# Patient Record
Sex: FEMALE | Race: WHITE | NOT HISPANIC OR LATINO | Employment: FULL TIME | URBAN - METROPOLITAN AREA
[De-identification: names, ages, dates, MRNs, and addresses within clinical notes are randomized per-mention and may not be internally consistent; named-entity substitution may affect disease eponyms.]

---

## 2017-09-16 ENCOUNTER — APPOINTMENT (OUTPATIENT)
Dept: CARDIOLOGY | Facility: CLINIC | Age: 27
End: 2017-09-16
Attending: EMERGENCY MEDICINE
Payer: COMMERCIAL

## 2017-09-16 ENCOUNTER — APPOINTMENT (OUTPATIENT)
Dept: GENERAL RADIOLOGY | Facility: CLINIC | Age: 27
End: 2017-09-16
Attending: EMERGENCY MEDICINE
Payer: COMMERCIAL

## 2017-09-16 ENCOUNTER — HOSPITAL ENCOUNTER (EMERGENCY)
Facility: CLINIC | Age: 27
Discharge: HOME | End: 2017-09-16
Attending: EMERGENCY MEDICINE | Admitting: EMERGENCY MEDICINE
Payer: COMMERCIAL

## 2017-09-16 VITALS
TEMPERATURE: 97.8 F | RESPIRATION RATE: 18 BRPM | DIASTOLIC BLOOD PRESSURE: 79 MMHG | SYSTOLIC BLOOD PRESSURE: 134 MMHG | BODY MASS INDEX: 20.83 KG/M2 | OXYGEN SATURATION: 99 % | HEART RATE: 90 BPM | WEIGHT: 125 LBS | HEIGHT: 65 IN

## 2017-09-16 DIAGNOSIS — R07.9 CHEST PAIN, UNSPECIFIED TYPE: ICD-10-CM

## 2017-09-16 LAB
ANION GAP SERPL CALCULATED.3IONS-SCNC: 11 MMOL/L (ref 3–14)
BASOPHILS # BLD AUTO: 0 10E9/L (ref 0–0.2)
BASOPHILS NFR BLD AUTO: 0.2 %
BUN SERPL-MCNC: 35 MG/DL (ref 7–30)
CALCIUM SERPL-MCNC: 9.4 MG/DL (ref 8.5–10.1)
CHLORIDE SERPL-SCNC: 105 MMOL/L (ref 94–109)
CO2 SERPL-SCNC: 22 MMOL/L (ref 20–32)
CREAT SERPL-MCNC: 0.76 MG/DL (ref 0.52–1.04)
D DIMER PPP FEU-MCNC: 0.4 UG/ML FEU (ref 0–0.5)
DIFFERENTIAL METHOD BLD: NORMAL
EOSINOPHIL # BLD AUTO: 0.1 10E9/L (ref 0–0.7)
EOSINOPHIL NFR BLD AUTO: 0.6 %
ERYTHROCYTE [DISTWIDTH] IN BLOOD BY AUTOMATED COUNT: 13.3 % (ref 10–15)
GFR SERPL CREATININE-BSD FRML MDRD: 89 ML/MIN/1.7M2
GLUCOSE SERPL-MCNC: 84 MG/DL (ref 70–99)
HCG SERPL QL: NEGATIVE
HCT VFR BLD AUTO: 41.9 % (ref 35–47)
HGB BLD-MCNC: 14.4 G/DL (ref 11.7–15.7)
IMM GRANULOCYTES # BLD: 0 10E9/L (ref 0–0.4)
IMM GRANULOCYTES NFR BLD: 0.1 %
INTERPRETATION ECG - MUSE: NORMAL
LYMPHOCYTES # BLD AUTO: 1.1 10E9/L (ref 0.8–5.3)
LYMPHOCYTES NFR BLD AUTO: 13.4 %
MCH RBC QN AUTO: 29.4 PG (ref 26.5–33)
MCHC RBC AUTO-ENTMCNC: 34.4 G/DL (ref 31.5–36.5)
MCV RBC AUTO: 86 FL (ref 78–100)
MONOCYTES # BLD AUTO: 0.9 10E9/L (ref 0–1.3)
MONOCYTES NFR BLD AUTO: 10.6 %
NEUTROPHILS # BLD AUTO: 6.2 10E9/L (ref 1.6–8.3)
NEUTROPHILS NFR BLD AUTO: 75.1 %
NRBC # BLD AUTO: 0 10*3/UL
NRBC BLD AUTO-RTO: 0 /100
PLATELET # BLD AUTO: 223 10E9/L (ref 150–450)
POTASSIUM SERPL-SCNC: 3.5 MMOL/L (ref 3.4–5.3)
RBC # BLD AUTO: 4.89 10E12/L (ref 3.8–5.2)
SODIUM SERPL-SCNC: 138 MMOL/L (ref 133–144)
TROPONIN I SERPL-MCNC: <0.015 UG/L (ref 0–0.04)
WBC # BLD AUTO: 8.2 10E9/L (ref 4–11)

## 2017-09-16 PROCEDURE — 80048 BASIC METABOLIC PNL TOTAL CA: CPT | Performed by: EMERGENCY MEDICINE

## 2017-09-16 PROCEDURE — 84703 CHORIONIC GONADOTROPIN ASSAY: CPT | Performed by: EMERGENCY MEDICINE

## 2017-09-16 PROCEDURE — 71020 XR CHEST 2 VW: CPT

## 2017-09-16 PROCEDURE — 25500064 ZZH RX 255 OP 636: Performed by: EMERGENCY MEDICINE

## 2017-09-16 PROCEDURE — 99285 EMERGENCY DEPT VISIT HI MDM: CPT | Mod: 25

## 2017-09-16 PROCEDURE — 85379 FIBRIN DEGRADATION QUANT: CPT | Performed by: EMERGENCY MEDICINE

## 2017-09-16 PROCEDURE — 93306 TTE W/DOPPLER COMPLETE: CPT | Mod: 26 | Performed by: INTERNAL MEDICINE

## 2017-09-16 PROCEDURE — 25000132 ZZH RX MED GY IP 250 OP 250 PS 637: Performed by: EMERGENCY MEDICINE

## 2017-09-16 PROCEDURE — 25000128 H RX IP 250 OP 636: Performed by: EMERGENCY MEDICINE

## 2017-09-16 PROCEDURE — 96374 THER/PROPH/DIAG INJ IV PUSH: CPT | Mod: 59

## 2017-09-16 PROCEDURE — 96375 TX/PRO/DX INJ NEW DRUG ADDON: CPT

## 2017-09-16 PROCEDURE — 93005 ELECTROCARDIOGRAM TRACING: CPT

## 2017-09-16 PROCEDURE — 84484 ASSAY OF TROPONIN QUANT: CPT | Performed by: EMERGENCY MEDICINE

## 2017-09-16 PROCEDURE — 85025 COMPLETE CBC W/AUTO DIFF WBC: CPT | Performed by: EMERGENCY MEDICINE

## 2017-09-16 PROCEDURE — 40000264 ECHO COMPLETE WITH LUMASON

## 2017-09-16 RX ORDER — KETOROLAC TROMETHAMINE 30 MG/ML
30 INJECTION, SOLUTION INTRAMUSCULAR; INTRAVENOUS EVERY 6 HOURS PRN
Status: DISCONTINUED | OUTPATIENT
Start: 2017-09-16 | End: 2017-09-16 | Stop reason: HOSPADM

## 2017-09-16 RX ORDER — ASPIRIN 81 MG/1
324 TABLET, CHEWABLE ORAL ONCE
Status: COMPLETED | OUTPATIENT
Start: 2017-09-16 | End: 2017-09-16

## 2017-09-16 RX ADMIN — KETOROLAC TROMETHAMINE 30 MG: 30 INJECTION, SOLUTION INTRAMUSCULAR at 14:03

## 2017-09-16 RX ADMIN — ASPIRIN 81 MG 324 MG: 81 TABLET ORAL at 12:43

## 2017-09-16 RX ADMIN — SULFUR HEXAFLUORIDE 5 ML: KIT at 16:09

## 2017-09-16 NOTE — ED AVS SNAPSHOT
Emergency Department    [address] [facility] [address]    Phone:  [phone]    Fax:  [phone]                                       Diana Altamirano   MRN: [id number]    Department:   Emergency Department   Date of Visit:  9/16/2017           Patient Information     Date Of Birth          1990        Your diagnoses for this visit were:     Chest pain, unspecified type        You were seen by Marce Parnell MD.      Follow-up Information     Follow up with Nikhil Amador MD In 3 days.    Specialty:  Internal Medicine    Contact information:    [facility]  [address] [facility] [address]  [facility] [address]  [phone]          Discharge Instructions          *CHEST PAIN, UNCERTAIN CAUSE    Based on your exam today, the exact cause of your chest pain is not certain. Your condition does not seem serious at this time, and your pain does not appear to be coming from your heart. However, sometimes the signs of a serious problem take more time to appear. Therefore, watch for the warning signs listed below.  HOME CARE:  1. Rest today and avoid strenuous activity.  2. Take any prescribed medicine as directed.  FOLLOW UP with your doctor in 1-3 days.   GET PROMPT MEDICAL ATTENTION if any of the following occur:    A change in the type of pain: if it feels different, becomes more severe, lasts longer, or begins to spread into your shoulder, arm, neck, jaw or back    Shortness of breath or increased pain with breathing    Weakness, dizziness, or fainting    Cough with blood or dark colored sputum (phlegm)    Fever over 101  F (38.3  C)    Swelling, pain or redness in one leg    [address] [place] [address]. All rights reserved. This information is not intended as a substitute for professional medical care. Always follow your healthcare professional's instructions.      24 Hour Appointment Hotline       To make an appointment at any [facility]  clinic, call [phone] ([phone]). If you don't have a family doctor or clinic, we will help you find one. [facility] are conveniently located to serve the needs of you and your family.             Review of your medicines      Notice     You have not been prescribed any medications.            Procedures and tests performed during your visit     Basic metabolic panel    CBC with platelets differential    Chest XR,  PA & LAT    D dimer quantitative    EKG 12 lead    EKG 12-lead, tracing only    Echo Complete with Lumason    HCG QUALitative pregnancy (blood)    Peripheral IV: Standard    Troponin I      Orders Needing Specimen Collection     None      Pending Results     No orders found from 9/14/2017 to 9/17/2017.            Pending Culture Results     No orders found from 9/14/2017 to 9/17/2017.            Pending Results Instructions     If you had any lab results that were not finalized at the time of your Discharge, you can call the ED Lab Result RN at [phone]. You will be contacted by this team for any positive Lab results or changes in treatment. The nurses are available 7 days a week from 10A to 6:30P.  You can leave a message 24 hours per day and they will return your call.        Test Results From Your Hospital Stay        9/16/2017 12:50 PM      Component Results     Component Value Ref Range & Units Status    WBC 8.2 4.0 - 11.0 10e9/L Final    RBC Count 4.89 3.8 - 5.2 10e12/L Final    Hemoglobin 14.4 11.7 - 15.7 g/dL Final    Hematocrit 41.9 35.0 - 47.0 % Final    MCV 86 78 - 100 fl Final    MCH 29.4 26.5 - 33.0 pg Final    MCHC 34.4 31.5 - 36.5 g/dL Final    RDW 13.3 10.0 - 15.0 % Final    Platelet Count 223 150 - 450 10e9/L Final    Diff Method Automated Method  Final    % Neutrophils 75.1 % Final    % Lymphocytes 13.4 % Final    % Monocytes 10.6 % Final    % Eosinophils 0.6 % Final    % Basophils 0.2 % Final    % Immature Granulocytes 0.1 % Final    Nucleated RBCs 0 0 /100 Final     Absolute Neutrophil 6.2 1.6 - 8.3 10e9/L Final    Absolute Lymphocytes 1.1 0.8 - 5.3 10e9/L Final    Absolute Monocytes 0.9 0.0 - 1.3 10e9/L Final    Absolute Eosinophils 0.1 0.0 - 0.7 10e9/L Final    Absolute Basophils 0.0 0.0 - 0.2 10e9/L Final    Abs Immature Granulocytes 0.0 0 - 0.4 10e9/L Final    Absolute Nucleated RBC 0.0  Final         9/16/2017  1:03 PM      Component Results     Component Value Ref Range & Units Status    Sodium 138 133 - 144 mmol/L Final    Potassium 3.5 3.4 - 5.3 mmol/L Final    Chloride 105 94 - 109 mmol/L Final    Carbon Dioxide 22 20 - 32 mmol/L Final    Anion Gap 11 3 - 14 mmol/L Final    Glucose 84 70 - 99 mg/dL Final    Urea Nitrogen 35 (H) 7 - 30 mg/dL Final    Creatinine 0.76 0.52 - 1.04 mg/dL Final    GFR Estimate 89 >60 mL/min/1.7m2 Final    Non  GFR Calc    GFR Estimate If Black >90 >60 mL/min/1.7m2 Final    African American GFR Calc    Calcium 9.4 8.5 - 10.1 mg/dL Final         9/16/2017  1:08 PM      Component Results     Component Value Ref Range & Units Status    Troponin I ES <0.015 0.000 - 0.045 ug/L Final    The 99th percentile for upper reference range is 0.045 ug/L.  Troponin values   in the range of 0.045 - 0.120 ug/L may be associated with risks of adverse   clinical events.           9/16/2017  1:08 PM      Component Results     Component Value Ref Range & Units Status    D Dimer 0.4 0.0 - 0.50 ug/ml FEU Final    This D-dimer assay is intended for use in conjunction with a clinical pretest   probability assessment model to exclude pulmonary embolism (PE) and deep   venous thrombosis (DVT) in outpatients suspected of PE or DVT. The cut-off   value is 0.5 ug/mL FEU.           9/16/2017  1:04 PM      Component Results     Component Value Ref Range & Units Status    HCG Qualitative Serum Negative NEG^Negative Final    This test is for screening purposes.  Results should be interpreted along with   the clinical picture.  Confirmation testing is  available if warranted by   ordering [id number], HCG Quantitative Pregnancy.           9/16/2017  2:20 PM      Narrative     XR CHEST 2 VW   9/16/2017 1:47 PM     HISTORY: Chest pain    COMPARISON: None.    FINDINGS: The heart is negative.  The lungs are clear. The pulmonary  vasculature is normal.  The bones and soft tissues are unremarkable.        Impression     IMPRESSION: No active infiltrate.        HOLLY MATT MD                Clinical Quality Measure: Blood Pressure Screening     Your blood pressure was checked while you were in the emergency department today. The last reading we obtained was  BP: 125/80 . Please read the guidelines below about what these numbers mean and what you should do about them.  If your systolic blood pressure (the top number) is less than 120 and your diastolic blood pressure (the bottom number) is less than 80, then your blood pressure is normal. There is nothing more that you need to do about it.  If your systolic blood pressure (the top number) is 120-139 or your diastolic blood pressure (the bottom number) is 80-89, your blood pressure may be higher than it should be. You should have your blood pressure rechecked within a year by a primary care provider.  If your systolic blood pressure (the top number) is 140 or greater or your diastolic blood pressure (the bottom number) is 90 or greater, you may have high blood pressure. High blood pressure is treatable, but if left untreated over time it can put you at risk for heart attack, stroke, or kidney failure. You should have your blood pressure rechecked by a primary care provider within the next 4 weeks.  If your provider in the emergency department today gave you specific instructions to follow-up with your doctor or provider even sooner than that, you should follow that instruction and not wait for up to 4 weeks for your follow-up visit.        Thank you for choosing [facility]       Thank you for choosing [facility] for your care. Our  "goal is always to provide you with excellent care. Hearing back from our patients is one way we can continue to improve our services. Please take a few minutes to complete the written survey that you may receive in the mail after you visit with us. Thank you!        [employer] Information     [employer] lets you send messages to your doctor, view your test results, renew your prescriptions, schedule appointments and more. To sign up, go to [email or web address] . Click on \"Log in\" on the left side of the screen, which will take you to the Welcome page. Then click on \"Sign up Now\" on the right side of the page.     You will be asked to enter the access code listed below, as well as some personal information. Please follow the directions to create your username and password.     Your access code is: [email or web address]  Expires: 12/15/2017  4:55 PM     Your access code will  in 90 days. If you need help or a new code, please call your [facility] clinic or [phone].        Care EveryWhere ID     This is your Care EveryWhere ID. This could be used by other organizations to access your [facility] medical records  [id number]        Equal Access to Services     PENNY YOUNG : Sudhir Thrasher, wajessica real, chery dubose, ameya ulloa. So [facility] [phone].    ATENCIÓN: Si habla español, tiene a curry disposición servicios gratuitos de asistencia lingüística. Radha al [phone].    We comply with applicable federal civil rights laws and [place] laws. We do not discriminate on the basis of race, color, national origin, age, disability sex, sexual orientation or gender identity.            After Visit Summary       This is your record. Keep this with you and show to your community pharmacist(s) and doctor(s) at your next visit.                  "

## 2017-09-16 NOTE — ED PROVIDER NOTES
"  History     Chief Complaint:  Chest Pain    HPI   Diana Altamirano is a 27 year old female who presents to the emergency department today for evaluation of chest pain. The patient states that she has chest pain for the past 2 days that has been getting more intense. She states that she went to urgent care yesterday and it got a little better, enough for her to go to sleep. But when she woke up today it had returned. She states that it is knife like and is located just to the left of her sternum without and breathing deep makes it worse. Rocking back and forth is the only thing that makes it better according to her.  She does run frequently and denies ever feeling this pain while running. She just got back from [place] recently and when she got back her  had packed up his things and left and is not talking to her anymore. Which is giving her significant anxiety. She denies any chronic health problems, she has not had any fever, cough, shortness of breath, and does not smoke either and states that this couldn't be a lifting injury either. She has no familial history of cardiac disease.    Allergies:  No known Drug Allergies      Medications:    Medications reviewed. No current medications.     Past Medical History:    History reviewed. No pertinent past medical history.    Past Surgical History:    History reviewed. No pertinent surgical history.    Family History:    History reviewed. No pertinent family history.     Social History:  Marital Status:    Social History   Substance Use Topics     Smoking status: Never Smoker     Smokeless tobacco: Not on file     Alcohol use Yes      Comment: occ        Review of Systems   Cardiovascular: Positive for chest pain.   All other systems reviewed and are negative.    Physical Exam   First Vitals:  BP: 148/85  Pulse: 90  Heart Rate: 90  Temp: 97.8  F (36.6  C)  Resp: 18  Height: 165.1 cm (5' 5\")  Weight: 56.7 kg (125 lb)  SpO2: 100 %    Physical Exam    Physical Exam "   Constitutional:  Patient is oriented to person, place, and time. They appear well-developed and well-nourished. Mild distress secondary to chest pain   HENT:   Mouth/Throat:   Oropharynx is clear and moist.   Eyes:    Conjunctivae normal and EOM are normal. Pupils are equal, round, and reactive to light.   Neck:    Normal range of motion.   Cardiovascular: Normal rate, regular rhythm and normal heart sounds.  Exam reveals no gallop and no friction rub.  No murmur heard.  Pulmonary/Chest:  Effort normal and breath sounds normal. Patient has no wheezes. Patient has no rales. Pleuritic pain very reproducible pain to palpation over the left sternal border  Abdominal:   Soft. Bowel sounds are normal. Patient exhibits no mass. There is no tenderness. There is no rebound and no guarding.   Musculoskeletal:  Normal range of motion. Patient exhibits no edema.   Neurological:   Patient is alert and oriented to person, place, and time. Patient has normal strength. No cranial nerve deficit or sensory deficit. GCS 15  Skin:   Skin is warm and dry. No rash noted. No erythema.   Psychiatric:   Patient has a normal mood and affect. Patient's behavior is normal. Judgment and thought content normal.       Emergency Department Course     ECG:  ECG taken at 1216, ECG read at 1221  Sinus rhythm with marked sinus arrhythmia  Possible left atrial enlargement  Rightward axis  T wave abnormality, consider inferior ischemia  T wave abnormality, consider anterolateral ischemia  Abnormal ECG  Rate 80 bpm. OH interval 130 ms. QRS duration 74 ms. QT/QTc 360/415 ms. P-R-T axes 75 96 -56.      Imaging:  Radiology findings were communicated with the patient who voiced understanding of the findings.  Chest XR,  PA & LAT  Final Result  IMPRESSION: No active infiltrate.        HOLLY MATT MD  Reading per radiology     Echocardiogram:  Verbal report is normal.      Laboratory:  Laboratory findings were communicated with the patient who voiced  understanding of the findings.    Troponin (Collected 1240): <0.015   D dimer: 0.4  HCG: negative  CBC: WBC 8.2, HGB 14.4,   BMP: BUN 35 (H), o/w WNL (Creatinine 0.76)      Interventions:  1403 Toradol 30 mg IV  1243 aspirin 324 mg PO  1609 sulfur hexafluoride microsphere 5 mls IV    Emergency Department Course:  Nursing notes and vitals reviewed.  I performed an exam of the patient as documented above.   1450 Dr. Tilley from cardio 1456 1170 verbal from cardiology: echo looks normal  Orders Placed This Encounter   Procedures     Chest XR,  PA & LAT     CBC with platelets differential     Basic metabolic panel     Troponin I     D dimer quantitative     HCG Qualitative pregnancy (blood)     EKG 12 lead     EKG 12-lead, tracing only     Pulse oximetry nursing     Cardiac Continuous Monitoring     Peripheral IV: Standard     Review medications with patient     Oxygen: Nasal cannula, Oxygen mask     Echo Complete with Lumason     I discussed the treatment plan with the patient. They expressed understanding of this plan and consented to discharge. They will be discharged home with instructions for care and follow up. In addition, the patient will return to the emergency department if their symptoms persist, worsen, if new symptoms arise or if there is any concern.  All questions were answered.   I personally reviewed the lab and imaging results with the patient and answered all related questions prior to discharge.  Impression & Plan      Medical Decision Making:  Diana Altamirano is a 27 year old female who presents to the emergency department today for evaluation of pleuritic chest pain of 2 days duration. Of note she did just comeback recently form [place] and when she came back she found her  had taken all of his things and moved out. She is very stressed form this. She denies any recent infectious symptoms such as cough or fever. She has no history of coronary artery disease. She is a young healthy  athlete. She does not get pain when she runs. This pain is very reproducible and pleuritic in nature.  Her vital sings show no signs of tachycardia or high blood pressure, she is not hypoxic.    The patients EKG is abnormal with inverted t waves in the inferior as well as the anterior septal leads and there is no old to compare it too. Diagnostic test were run and she is not acutely anemic, she is not pregnant, no acute metabolic derangement, her cardiac enzyme is within normal limits and again the pain has been going on for 2 days. Her dimer is normal and her Well s criteria makes her low risk for PE. Chest X ray shows no acute findings. I spoke with her regarding CT scan but at this time I don t think it s necessary being that her blood work is normal. I spoke with Dr. Tilley of cardiology due to her abnormal EKG and at this point he recommended getting a stat echo which I did. I spoke with cardiology who read this and it s completely normal. No evidence of cardiomyopathy, taksobu, wall motion abnormalities.    At this time this does not appear to be cardiac. It s non-exertional but is very pleuritic and she did get better with Toradol. I will right her for an out patient stress test and it was deemed she would be safe for discharged if the echo was fine. She also had  [facility] give her referrals for therapist appointment    Diagnosis:    ICD-10-CM    1. Chest pain, unspecified type R07.9      Disposition:   Discharged     Scribe Disclosure:  I, Brian Rainey, am serving as a scribe at 2:55 PM on 9/16/2017 to document services personally performed by Marce Parnell MD, based on my observations and the provider's statements to me.       EMERGENCY DEPARTMENT       Marce Parnell MD  09/17/17 2093

## 2017-09-16 NOTE — DISCHARGE INSTRUCTIONS
*CHEST PAIN, UNCERTAIN CAUSE    Based on your exam today, the exact cause of your chest pain is not certain. Your condition does not seem serious at this time, and your pain does not appear to be coming from your heart. However, sometimes the signs of a serious problem take more time to appear. Therefore, watch for the warning signs listed below.  HOME CARE:  1. Rest today and avoid strenuous activity.  2. Take any prescribed medicine as directed.  FOLLOW UP with your doctor in 1-3 days.   GET PROMPT MEDICAL ATTENTION if any of the following occur:    A change in the type of pain: if it feels different, becomes more severe, lasts longer, or begins to spread into your shoulder, arm, neck, jaw or back    Shortness of breath or increased pain with breathing    Weakness, dizziness, or fainting    Cough with blood or dark colored sputum (phlegm)    Fever over 101  F (38.3  C)    Swelling, pain or redness in one leg    9321-1562 [address], [place] [address]. All rights reserved. This information is not intended as a substitute for professional medical care. Always follow your healthcare professional's instructions.

## 2017-09-16 NOTE — ED AVS SNAPSHOT
Emergency Department    [address] [place] [address]    Phone:  [phone]    Fax:  [phone]                                       Diana Altamirano   MRN: [id number]    Department:   Emergency Department   Date of Visit:  9/16/2017           After Visit Summary Signature Page     I have received my discharge instructions, and my questions have been answered. I have discussed any challenges I see with this plan with the nurse or doctor.    ..........................................................................................................................................  Patient/Patient Representative Signature      ..........................................................................................................................................  Patient Representative Print Name and Relationship to Patient    ..................................................               ................................................  Date                                            Time    ..........................................................................................................................................  Reviewed by Signature/Title    ...................................................              ..............................................  Date                                                            Time

## 2017-09-20 NOTE — PROGRESS NOTES
"  SUBJECTIVE:   Diana Altamirano is a 27 year old female who presents to clinic today for the following health issues:      ED/UC Followup:    Facility:  [facility]  Date of visit: 9/16/17  Reason for visit: Chest pain  Current Status: better       Patient was recently in the emergency room due to complaint of left-sided chest pain. Chest x-ray was normal and blood work -- which included a d-dimer and a single troponin test -- were unremarkable.  Her EKG though showed inverted T waves at leads II, III, aVL, aVF, V1-2, and V4-6.  No ST segment elevation/depression.    Since the patient's discharge from the hospital, she continues to have dull left-sided chest pain. She states that the pain feels less intense. No shortness of breath or palpitations.  Pain is not clearly precipitated/aggravated by movement.      Past medical history: denies history of cardiac problems      Review of Systems   Constitutional: Negative for chills, fatigue and fever.   Respiratory: Negative for cough and shortness of breath.    Cardiovascular: Positive for chest pain. Negative for palpitations and leg swelling.   Gastrointestinal: Negative for abdominal pain, nausea and vomiting.   Neurological: Negative for syncope and light-headedness.   Psychiatric/Behavioral: The patient is nervous/anxious.        /85  Pulse 83  Temp 98.2  F (36.8  C) (Oral)  Ht 5' 5\" (1.651 m)  Wt 119 lb (54 kg)  SpO2 98%  Breastfeeding? No  BMI 19.8 kg/m2      Physical Exam   Constitutional: She is oriented to person, place, and time. No distress.   Neck: No thyromegaly present.   Cardiovascular: Normal rate, regular rhythm and normal heart sounds.    Pulmonary/Chest: Effort normal and breath sounds normal. No respiratory distress. She exhibits tenderness (On palpation of the left upper parasternal border).   Abdominal: Soft. There is no tenderness.   Neurological: She is alert and oriented to person, place, and time.   Psychiatric:   Anxious appearing "   Vitals reviewed.        ICD-10-CM    1. Chest pain, unspecified type R07.9 CARDIOLOGY EVAL ADULT REFERRAL     EKG 12-lead complete w/read - Clinics   2. Abnormal EKG R94.31 CARDIOLOGY EVAL ADULT REFERRAL     EKG 12-lead complete w/read - Clinics     **please refer to HPI for status of conditions    **Discussed EKG results with the patient and her mother      Patient Instructions   Seek immediate medical attention if your chest pain worsens, or if you develop shortness of breath, lightheadedness/fainting, palpitations, or any other unusual symptoms.

## 2017-09-21 ENCOUNTER — OFFICE VISIT (OUTPATIENT)
Dept: FAMILY MEDICINE | Facility: CLINIC | Age: 27
End: 2017-09-21
Payer: COMMERCIAL

## 2017-09-21 VITALS
SYSTOLIC BLOOD PRESSURE: 129 MMHG | HEIGHT: 65 IN | HEART RATE: 83 BPM | TEMPERATURE: 98.2 F | BODY MASS INDEX: 19.83 KG/M2 | DIASTOLIC BLOOD PRESSURE: 85 MMHG | WEIGHT: 119 LBS | OXYGEN SATURATION: 98 %

## 2017-09-21 DIAGNOSIS — R07.9 CHEST PAIN, UNSPECIFIED TYPE: Primary | ICD-10-CM

## 2017-09-21 DIAGNOSIS — R94.31 ABNORMAL EKG: ICD-10-CM

## 2017-09-21 PROCEDURE — 93000 ELECTROCARDIOGRAM COMPLETE: CPT | Performed by: INTERNAL MEDICINE

## 2017-09-21 PROCEDURE — 99203 OFFICE O/P NEW LOW 30 MIN: CPT | Performed by: INTERNAL MEDICINE

## 2017-09-21 ASSESSMENT — ENCOUNTER SYMPTOMS
PALPITATIONS: 0
FATIGUE: 0
LIGHT-HEADEDNESS: 0
VOMITING: 0
COUGH: 0
ABDOMINAL PAIN: 0
FEVER: 0
CHILLS: 0
NAUSEA: 0
NERVOUS/ANXIOUS: 1
SHORTNESS OF BREATH: 0

## 2017-09-21 NOTE — PATIENT INSTRUCTIONS
Seek immediate medical attention if your chest pain worsens, or if you develop shortness of breath, lightheadedness/fainting, palpitations, or any other unusual symptoms.

## 2017-09-21 NOTE — MR AVS SNAPSHOT
After Visit Summary   9/21/2017    Diana Altamirano    MRN: [id number]           Patient Information     Date Of Birth          1990        Visit Information        Provider Department      9/21/2017 2:30 PM Arash Jamison MD [facility]        Today's Diagnoses     Chest pain, unspecified type    -  1    Abnormal EKG          Care Instructions    Seek immediate medical attention if your chest pain worsens, or if you develop shortness of breath, lightheadedness/fainting, palpitations, or any other unusual symptoms.          Follow-ups after your visit        Additional Services     CARDIOLOGY EVAL ADULT REFERRAL       Your provider has referred you to:  [facility]: [facility] [phone]   [email or web address]    Please be aware that coverage of these services is subject to the terms and limitations of your health insurance plan.  Call member services at your health plan with any benefit or coverage questions.      Type of Referral:  New Cardiology Consult    Timeframe requested:  1-3 Days    Please bring the following to your appointment:  >>   Any x-rays, CTs or MRIs which have been performed.  Contact the facility where they were done to arrange for  prior to your scheduled appointment.    >>   List of current medications  >>   This referral request   >>   Any documents/labs given to you for this referral                  Who to contact     If you have questions or need follow up information about today's clinic visit or your schedule please contact [facility] directly at [phone].  Normal or non-critical lab and imaging results will be communicated to you by MyChart, letter or phone within 4 business days after the clinic has received the results. If you do not hear from us within 7 days, please contact the clinic through MyChart or phone. If you have a critical or  "abnormal lab result, we will notify you by phone as soon as possible.  Submit refill requests through [email or web address] or call your pharmacy and they will forward the refill request to us. Please allow 3 business days for your refill to be completed.          Additional Information About Your Visit        MyChart Information     [email or web address] lets you send messages to your doctor, view your test results, renew your prescriptions, schedule appointments and more. To sign up, go to [email or web address] . Click on \"Log in\" on the left side of the screen, which will take you to the Welcome page. Then click on \"Sign up Now\" on the right side of the page.     You will be asked to enter the access code listed below, as well as some personal information. Please follow the directions to create your username and password.     Your access code is: [email or web address]  Expires: 12/15/2017  4:55 PM     Your access code will  in 90 days. If you need help or a new code, please call your [place] clinic or [phone].        Care EveryWhere ID     This is your Care EveryWhere ID. This could be used by other organizations to access your [place] medical records  [id number]        Your Vitals Were     Pulse Temperature Height Pulse Oximetry Breastfeeding? BMI (Body Mass Index)    83 98.2  F (36.8  C) (Oral) 5' 5\" (1.651 m) 98% No 19.8 kg/m2       Blood Pressure from Last 3 Encounters:   17 129/85   17 134/79    Weight from Last 3 Encounters:   17 119 lb (54 kg)   17 125 lb (56.7 kg)              We Performed the Following     CARDIOLOGY EVAL ADULT REFERRAL        Primary Care Provider    None       No address on file        Equal Access to Services     [facility]: Hadii holley Thrasher, waarelisda luqadaha, qaybta kaalmada gracy, ameya ulloa. So [facility] [phone].    ATENCIÓN: Si habla español, tiene a curry disposición servicios gratuitos de asistencia lingüística. Llame al " [phone].    We comply with applicable federal civil rights laws and [place] laws. We do not discriminate on the basis of race, color, national origin, age, disability sex, sexual orientation or gender identity.            Thank you!     Thank you for choosing [facility]  for your care. Our goal is always to provide you with excellent care. Hearing back from our patients is one way we can continue to improve our services. Please take a few minutes to complete the written survey that you may receive in the mail after your visit with us. Thank you!             Your Updated Medication List - Protect others around you: Learn how to safely use, store and throw away your medicines at www.disposemymeds.org.      Notice  As of 9/21/2017  2:40 PM    You have not been prescribed any medications.

## 2017-09-21 NOTE — NURSING NOTE
"Chief Complaint   Patient presents with     Hospital F/U       Initial /85  Pulse 83  Temp 98.2  F (36.8  C) (Oral)  Ht 5' 5\" (1.651 m)  Wt 119 lb (54 kg)  SpO2 98%  Breastfeeding? No  BMI 19.8 kg/m2 Estimated body mass index is 19.8 kg/(m^2) as calculated from the following:    Height as of this encounter: 5' 5\" (1.651 m).    Weight as of this encounter: 119 lb (54 kg).  Medication Reconciliation: complete   Jennifer DILLARD CMA      "

## 2018-05-01 ENCOUNTER — TRANSFERRED RECORDS (OUTPATIENT)
Dept: HEALTH INFORMATION MANAGEMENT | Facility: CLINIC | Age: 28
End: 2018-05-01

## 2018-05-01 LAB — PAP SMEAR - HIM PATIENT REPORTED: NORMAL

## 2019-01-18 ENCOUNTER — HOSPITAL ENCOUNTER (EMERGENCY)
Facility: CLINIC | Age: 29
Discharge: HOME | End: 2019-01-19
Attending: EMERGENCY MEDICINE | Admitting: EMERGENCY MEDICINE
Payer: COMMERCIAL

## 2019-01-18 DIAGNOSIS — E87.6 HYPOKALEMIA: ICD-10-CM

## 2019-01-18 DIAGNOSIS — R11.10 VOMITING AND DIARRHEA: ICD-10-CM

## 2019-01-18 DIAGNOSIS — R19.7 VOMITING AND DIARRHEA: ICD-10-CM

## 2019-01-18 DIAGNOSIS — E86.0 DEHYDRATION: ICD-10-CM

## 2019-01-18 PROCEDURE — 99284 EMERGENCY DEPT VISIT MOD MDM: CPT | Mod: 25

## 2019-01-18 PROCEDURE — 25000128 H RX IP 250 OP 636: Performed by: EMERGENCY MEDICINE

## 2019-01-18 PROCEDURE — 96374 THER/PROPH/DIAG INJ IV PUSH: CPT

## 2019-01-18 PROCEDURE — 84703 CHORIONIC GONADOTROPIN ASSAY: CPT | Performed by: EMERGENCY MEDICINE

## 2019-01-18 PROCEDURE — 85025 COMPLETE CBC W/AUTO DIFF WBC: CPT | Performed by: EMERGENCY MEDICINE

## 2019-01-18 PROCEDURE — 83690 ASSAY OF LIPASE: CPT | Performed by: EMERGENCY MEDICINE

## 2019-01-18 PROCEDURE — 80053 COMPREHEN METABOLIC PANEL: CPT | Performed by: EMERGENCY MEDICINE

## 2019-01-18 RX ORDER — SODIUM CHLORIDE 9 MG/ML
1000 INJECTION, SOLUTION INTRAVENOUS CONTINUOUS
Status: DISCONTINUED | OUTPATIENT
Start: 2019-01-18 | End: 2019-01-19 | Stop reason: HOSPADM

## 2019-01-18 RX ORDER — ONDANSETRON 2 MG/ML
4 INJECTION INTRAMUSCULAR; INTRAVENOUS EVERY 30 MIN PRN
Status: DISCONTINUED | OUTPATIENT
Start: 2019-01-18 | End: 2019-01-19 | Stop reason: HOSPADM

## 2019-01-18 RX ORDER — SODIUM CHLORIDE 9 MG/ML
INJECTION, SOLUTION INTRAVENOUS CONTINUOUS
Status: DISCONTINUED | OUTPATIENT
Start: 2019-01-18 | End: 2019-01-19 | Stop reason: HOSPADM

## 2019-01-18 RX ADMIN — ONDANSETRON 4 MG: 2 INJECTION INTRAMUSCULAR; INTRAVENOUS at 23:59

## 2019-01-18 ASSESSMENT — MIFFLIN-ST. JEOR: SCORE: 1281.07

## 2019-01-18 NOTE — ED AVS SNAPSHOT
Emergency Department  [address] [place] [id number]  Phone:  [phone]  Fax:  [phone]                                    Diana Altamirano   MRN: [id number]    Department:   Emergency Department   Date of Visit:  1/18/2019           After Visit Summary Signature Page    I have received my discharge instructions, and my questions have been answered. I have discussed any challenges I see with this plan with the nurse or doctor.    ..........................................................................................................................................  Patient/Patient Representative Signature      ..........................................................................................................................................  Patient Representative Print Name and Relationship to Patient    ..................................................               ................................................  Date                                   Time    ..........................................................................................................................................  Reviewed by Signature/Title    ...................................................              ..............................................  Date                                               Time          22EPIC Rev 08/18

## 2019-01-19 VITALS
TEMPERATURE: 98.1 F | BODY MASS INDEX: 19.29 KG/M2 | WEIGHT: 120 LBS | DIASTOLIC BLOOD PRESSURE: 60 MMHG | OXYGEN SATURATION: 99 % | RESPIRATION RATE: 16 BRPM | HEIGHT: 66 IN | HEART RATE: 89 BPM | SYSTOLIC BLOOD PRESSURE: 109 MMHG

## 2019-01-19 LAB
ALBUMIN SERPL-MCNC: 4.4 G/DL (ref 3.4–5)
ALP SERPL-CCNC: 45 U/L (ref 40–150)
ALT SERPL W P-5'-P-CCNC: 22 U/L (ref 0–50)
ANION GAP SERPL CALCULATED.3IONS-SCNC: 12 MMOL/L (ref 3–14)
AST SERPL W P-5'-P-CCNC: 18 U/L (ref 0–45)
BASOPHILS # BLD AUTO: 0 10E9/L (ref 0–0.2)
BASOPHILS NFR BLD AUTO: 0.1 %
BILIRUB SERPL-MCNC: 1.1 MG/DL (ref 0.2–1.3)
BUN SERPL-MCNC: 21 MG/DL (ref 7–30)
CALCIUM SERPL-MCNC: 9.3 MG/DL (ref 8.5–10.1)
CHLORIDE SERPL-SCNC: 108 MMOL/L (ref 94–109)
CO2 SERPL-SCNC: 18 MMOL/L (ref 20–32)
CREAT SERPL-MCNC: 0.78 MG/DL (ref 0.52–1.04)
DIFFERENTIAL METHOD BLD: ABNORMAL
EOSINOPHIL # BLD AUTO: 0.1 10E9/L (ref 0–0.7)
EOSINOPHIL NFR BLD AUTO: 0.4 %
ERYTHROCYTE [DISTWIDTH] IN BLOOD BY AUTOMATED COUNT: 13 % (ref 10–15)
GFR SERPL CREATININE-BSD FRML MDRD: >90 ML/MIN/{1.73_M2}
GLUCOSE SERPL-MCNC: 136 MG/DL (ref 70–99)
HCG SERPL QL: NEGATIVE
HCT VFR BLD AUTO: 41.5 % (ref 35–47)
HGB BLD-MCNC: 14.5 G/DL (ref 11.7–15.7)
IMM GRANULOCYTES # BLD: 0 10E9/L (ref 0–0.4)
IMM GRANULOCYTES NFR BLD: 0.3 %
LIPASE SERPL-CCNC: 110 U/L (ref 73–393)
LYMPHOCYTES # BLD AUTO: 0.6 10E9/L (ref 0.8–5.3)
LYMPHOCYTES NFR BLD AUTO: 4.9 %
MCH RBC QN AUTO: 28.8 PG (ref 26.5–33)
MCHC RBC AUTO-ENTMCNC: 34.9 G/DL (ref 31.5–36.5)
MCV RBC AUTO: 83 FL (ref 78–100)
MONOCYTES # BLD AUTO: 0.3 10E9/L (ref 0–1.3)
MONOCYTES NFR BLD AUTO: 2.4 %
NEUTROPHILS # BLD AUTO: 11.7 10E9/L (ref 1.6–8.3)
NEUTROPHILS NFR BLD AUTO: 91.9 %
NRBC # BLD AUTO: 0 10*3/UL
NRBC BLD AUTO-RTO: 0 /100
PLATELET # BLD AUTO: 221 10E9/L (ref 150–450)
POTASSIUM SERPL-SCNC: 3.2 MMOL/L (ref 3.4–5.3)
PROT SERPL-MCNC: 8.8 G/DL (ref 6.8–8.8)
RBC # BLD AUTO: 5.03 10E12/L (ref 3.8–5.2)
SODIUM SERPL-SCNC: 138 MMOL/L (ref 133–144)
WBC # BLD AUTO: 12.8 10E9/L (ref 4–11)

## 2019-01-19 PROCEDURE — 25000128 H RX IP 250 OP 636: Performed by: EMERGENCY MEDICINE

## 2019-01-19 PROCEDURE — 96376 TX/PRO/DX INJ SAME DRUG ADON: CPT

## 2019-01-19 PROCEDURE — 96361 HYDRATE IV INFUSION ADD-ON: CPT

## 2019-01-19 RX ORDER — ONDANSETRON 4 MG/1
4 TABLET, ORALLY DISINTEGRATING ORAL EVERY 6 HOURS PRN
Qty: 15 TABLET | Refills: 0 | Status: SHIPPED | OUTPATIENT
Start: 2019-01-19 | End: 2019-02-18

## 2019-01-19 RX ADMIN — SODIUM CHLORIDE 1000 ML: 9 INJECTION, SOLUTION INTRAVENOUS at 00:35

## 2019-01-19 RX ADMIN — SODIUM CHLORIDE 1000 ML: 9 INJECTION, SOLUTION INTRAVENOUS at 00:00

## 2019-01-19 RX ADMIN — ONDANSETRON 4 MG: 2 INJECTION INTRAMUSCULAR; INTRAVENOUS at 00:36

## 2019-01-19 NOTE — ED PROVIDER NOTES
"  History     Chief Complaint:  Abdominal pain    The history is provided by the patient and the spouse.      Diana Altamirano is a 28 year old female who presents with abdominal pain. The patient's  states that for the past several days, she has had some vaginal bleeding spotting (does not concern her itself), but has otherwise felt completely normal. The patient states that she has felt fine all day, until about 4 hours ago when she began having nonbloody diarrhea. The patient reports spasming abdominal pain, nausea, and nonbloody emesis. The patient states that she took Pepto Bismol with incomplete relief. The patient states that she has not been able to keep down any water since she began vomiting, and she feels \"very dry.\" The patient notes that she ate some freeze dried food from her job at [employer] this morning that may have been . She denies recent antibiotic use. She denies fevers, headache, or dizziness.  No prior abdominal surgeries.      Allergies:  No known drug allergies      Medications:    Ativan     Past Medical History:    The patient does not have any past pertinent medical history.     Past Surgical History:    History reviewed. No pertinent surgical history.     Family History:    History reviewed. No pertinent family history.      Social History:  The patient is accompanied to the ED by   Marital Status:    Smoking status: never  Alcohol use: yes, occasionally       Review of Systems   All other systems reviewed and are negative.    Physical Exam     Patient Vitals for the past 24 hrs:   BP Temp Temp src Pulse Heart Rate Resp SpO2 Height Weight   19 0231 109/60 -- -- 89 -- 16 99 % -- --   19 0035 108/63 -- -- 84 -- -- 100 % -- --   19 0000 -- 98.1  F (36.7  C) Tympanic -- -- -- -- -- --   19 -- -- -- 110 110 22 100 % -- --   19 -- -- -- -- -- -- -- 1.676 m (5' 6\") 54.4 kg (120 lb)      Physical Exam  General: uncomfortable appearing " woman recumbent in room 29,  at bedside  HENT: mucous membranes dry, OP clear  CV: initially tachycardic rate, no LE edema, no murmur audible  Resp: clear throughout, normal effort, no crackles or wheezing  GI: abdomen soft, mild diffuse tenderness without guarding, hyperactive bowel sounds throughout, no distension  MSK: no bony tenderness, no CVAT  Skin: appropriately warm and dry  Neuro: alert, clear speech, oriented   Psych: cooperative      Emergency Department Course     Laboratory:  CBC: WBC 12.8, o/w WNL (HGB 14.5, )     CMP: potassium 3.2, CO2 18, glucose 136, creatinine 0.78    Lipase: 110    HCG qualitative: Negative    Interventions:  0000: NS 1L IV Bolus   0035: NS 1L IV Bolus  0036: Zofran 4 mg, IV     Emergency Department Course:  Past medical records, nursing notes, and vitals reviewed.  2334: I performed an exam of the patient and obtained history, as documented above.   IV inserted and blood drawn.     I performed electronic chart review in [employer].  The patient passed a PO challenge prior to discharge from the ED.    0210: I rechecked the patient. Explained findings to the patient.  She feels improved.     I rechecked the patient. Findings and plan explained to the Patient. Patient discharged home with instructions regarding supportive care, medications, and reasons to return. The importance of close follow-up was reviewed.      Impression & Plan      Medical Decision Making:  Differential diagnosis includes adverse reaction to something she ate, viral or other type of infection, irritable bowel syndrome, bowel obstruction, and many others.  Testing notable for mild hypokalemia likely a result of her presenting symptoms.  She felt much improved with symptomatic treatment and passed an oral challenge such that she was comfortable with the plan for discharge home and ongoing supportive management with antiemetics prescribed.  I do not think she requires emergent imaging given low  suspicion for an acutely serious or surgical process, the return precautions reviewed and agreed upon with patient and her .  She was discharged home in improved condition.  Close follow-up through primary care.      Diagnosis:    ICD-10-CM   1. Vomiting and diarrhea R11.10    R19.7   2. Dehydration E86.0   3. Hypokalemia E87.6       Disposition:  discharged to home    Discharge Medications:     Medication List      Started    ondansetron 4 MG ODT tab  Commonly known as:  ZOFRAN ODT  4 mg, Oral, EVERY 6 HOURS PRN            This record was created at least in part using electronic voice recognition software, so please excuse any typographical errors.     I, Megan Beh, am serving as a scribe at 11:34 PM on 1/18/2019 to document services personally performed by Denilson Hein MD based on my observations and the provider's statements to me.    Megan Beh  1/18/2019    EMERGENCY DEPARTMENT       Denilson Hein MD  01/19/19 0681

## 2019-06-27 ENCOUNTER — OFFICE VISIT (OUTPATIENT)
Dept: INTERNAL MEDICINE | Facility: CLINIC | Age: 29
End: 2019-06-27
Payer: COMMERCIAL

## 2019-06-27 VITALS
HEART RATE: 62 BPM | OXYGEN SATURATION: 100 % | WEIGHT: 123.3 LBS | DIASTOLIC BLOOD PRESSURE: 82 MMHG | BODY MASS INDEX: 19.82 KG/M2 | RESPIRATION RATE: 18 BRPM | HEIGHT: 66 IN | SYSTOLIC BLOOD PRESSURE: 114 MMHG

## 2019-06-27 DIAGNOSIS — F43.10 PTSD (POST-TRAUMATIC STRESS DISORDER): ICD-10-CM

## 2019-06-27 DIAGNOSIS — F33.2 SEVERE EPISODE OF RECURRENT MAJOR DEPRESSIVE DISORDER, WITHOUT PSYCHOTIC FEATURES (H): ICD-10-CM

## 2019-06-27 DIAGNOSIS — F42.9 OBSESSIVE-COMPULSIVE DISORDER, UNSPECIFIED TYPE: ICD-10-CM

## 2019-06-27 DIAGNOSIS — F41.1 GAD (GENERALIZED ANXIETY DISORDER): ICD-10-CM

## 2019-06-27 DIAGNOSIS — Z00.01 ENCOUNTER FOR ROUTINE ADULT MEDICAL EXAM WITH ABNORMAL FINDINGS: Primary | ICD-10-CM

## 2019-06-27 PROCEDURE — 99395 PREV VISIT EST AGE 18-39: CPT | Performed by: INTERNAL MEDICINE

## 2019-06-27 PROCEDURE — 99214 OFFICE O/P EST MOD 30 MIN: CPT | Mod: 25 | Performed by: INTERNAL MEDICINE

## 2019-06-27 RX ORDER — DIAZEPAM 2 MG
TABLET ORAL
COMMUNITY
Start: 2019-06-21 | End: 2020-06-08

## 2019-06-27 RX ORDER — SERTRALINE HYDROCHLORIDE 25 MG/1
TABLET, FILM COATED ORAL
COMMUNITY
Start: 2019-06-21 | End: 2019-06-27

## 2019-06-27 SDOH — HEALTH STABILITY: MENTAL HEALTH: HOW OFTEN DO YOU HAVE 6 OR MORE DRINKS ON ONE OCCASION?: NEVER

## 2019-06-27 SDOH — HEALTH STABILITY: MENTAL HEALTH: HOW OFTEN DO YOU HAVE A DRINK CONTAINING ALCOHOL?: 2-4 TIMES A MONTH

## 2019-06-27 SDOH — HEALTH STABILITY: MENTAL HEALTH: HOW MANY STANDARD DRINKS CONTAINING ALCOHOL DO YOU HAVE ON A TYPICAL DAY?: 1 OR 2

## 2019-06-27 ASSESSMENT — ANXIETY QUESTIONNAIRES
GAD7 TOTAL SCORE: 18
2. NOT BEING ABLE TO STOP OR CONTROL WORRYING: NEARLY EVERY DAY
7. FEELING AFRAID AS IF SOMETHING AWFUL MIGHT HAPPEN: MORE THAN HALF THE DAYS
5. BEING SO RESTLESS THAT IT IS HARD TO SIT STILL: NEARLY EVERY DAY
1. FEELING NERVOUS, ANXIOUS, OR ON EDGE: NEARLY EVERY DAY
3. WORRYING TOO MUCH ABOUT DIFFERENT THINGS: NEARLY EVERY DAY
IF YOU CHECKED OFF ANY PROBLEMS ON THIS QUESTIONNAIRE, HOW DIFFICULT HAVE THESE PROBLEMS MADE IT FOR YOU TO DO YOUR WORK, TAKE CARE OF THINGS AT HOME, OR GET ALONG WITH OTHER PEOPLE: SOMEWHAT DIFFICULT
6. BECOMING EASILY ANNOYED OR IRRITABLE: SEVERAL DAYS

## 2019-06-27 ASSESSMENT — MIFFLIN-ST. JEOR: SCORE: 1283.1

## 2019-06-27 ASSESSMENT — PATIENT HEALTH QUESTIONNAIRE - PHQ9
5. POOR APPETITE OR OVEREATING: NEARLY EVERY DAY
SUM OF ALL RESPONSES TO PHQ QUESTIONS 1-9: 27

## 2019-06-27 NOTE — Clinical Note
Please abstract the following data from this visit with this patient into the appropriate field in Epic:Pap smear done on this date: 05/2018 (approximately), by this group: unknown, results were normal.

## 2019-06-27 NOTE — PROGRESS NOTES
SUBJECTIVE                                                      HPI: Diana Altamirano is a pleasant 29 year old female who presents for a physical.    No specific complaints, concerns, or questions, but ADDY-7 and PHQ-9 scores indicates severe anxiety and depression, respectively. No SI, but patient is having difficulty making it through her workday. Patient says that she ran out of sertraline several weeks ago, prompting worsening of symptoms. Restarted sertraline 25 mg daily 1 week ago. Follows with a therapist. Open to increasing dose of sertraline.    ROS:  Constitutional: denies unintentional weight loss or gain; denies fevers, chills, or sweats     Cardiovascular: denies chest pain, palpitations, or edema  Respiratory: denies cough, wheezing, shortness of breath, or dyspnea on exertion  Gastrointestinal: denies nausea, vomiting, constipation, diarrhea, or abdominal pain  Genitourinary: denies urinary frequency, urgency, dysuria, or hematuria  Integumentary: denies rash or pruritus  Musculoskeletal: denies back pain, muscle pain, joint pain, or joint swelling  Neurologic: denies focal weakness, numbness, or tingling  Hematologic/Immunologic: denies history of anemia or blood transfusions  Endocrine: denies heat or cold intolerance; denies polyuria, polydipsia  Psychiatric: see above and PMH below    Past Medical History:   Diagnosis Date     Anorexia      ADDY (generalized anxiety disorder)      MDD (major depressive disorder)      OCD (obsessive compulsive disorder)      PTSD (post-traumatic stress disorder)      Past Surgical History:   Procedure Laterality Date     NO HISTORY OF SURGERY       Family History   Problem Relation Age of Onset     Other - See Comments Sister         mental health     Prostate Cancer Paternal Grandfather      Neurologic Disorder Mother         PD     Neurologic Disorder Paternal Uncle         PD     Diabetes No family hx of      Myocardial Infarction No family hx of       "Cerebrovascular Disease No family hx of      Coronary Artery Disease Early Onset No family hx of      Breast Cancer No family hx of      Ovarian Cancer No family hx of      Colon Cancer No family hx of      Social History     Occupational History     Occupation: [employer] - [employer]   Tobacco Use     Smoking status: Never Smoker     Smokeless tobacco: Never Used   Substance and Sexual Activity     Alcohol use: Yes     Frequency: 2-4 times a month     Drinks per session: 1 or 2     Binge frequency: Never     Drug use: No     Sexual activity: Yes     Partners: Male     Birth control/protection: Condom   Social History Narrative    .    No kids.    Running regularly.      No Known Allergies     Current Outpatient Medications   Medication Sig     diazepam (VALIUM) 2 MG tablet      sertraline (ZOLOFT) 25 MG tablet Take 1 tablet (25 mg) by mouth daily     Immunization History   Administered Date(s) Administered     TDAP Vaccine (Adacel) 08/14/2012     OBJECTIVE                                                      /82   Pulse 62   Resp 18   Ht 1.664 m (5' 5.5\")   Wt 55.9 kg (123 lb 4.8 oz)   LMP 06/15/2019   SpO2 100%   BMI 20.21 kg/m    Constitutional: well-appearing  Eyes: normal conjunctivae and lids; pupils equal, round, and reactive to light  Ears, Nose, Mouth, and Throat: normal ears and nose; tympanic membranes visualized and normal; normal lips, teeth, and gums; no oropharyngeal lesions or ulcers  Neck: supple and symmetric; no lymphadenopathy; no thyromegaly or masses  Respiratory: normal respiratory effort; clear to auscultation bilaterally  Cardiovascular: regular rate and rhythm; pedal pulses palpable; no edema  Gastrointestinal: soft, non-tender, non-distended, and bowel sounds present; no organomegaly or masses  Musculoskeletal: normal gait and station  Psych: normal judgment and insight; normal mood and affect; recent and remote memory intact; oriented to time, place, and person    PREVENTATIVE " HEALTH                                                      BMI: within normal limits   Blood pressure: within normal limits   Breast CA screening: not medically indicated at this time   Cervical CA screening: last pap performed in 2018 ([facility]); normal, per patient repeat in 3 to 5 years Colon CA screening: not medically indicated at this time   Lung CA screening: n/a   Dexa: not medically indicated at this time   Screening HCV: n/a   Screening HIV: DUE - can perform with future labs  Screening cholesterol: not medically indicated at this time   Screening diabetes: not medically indicated at this time   STD testing: no risk factors present  Alcohol misuse screening: alcohol use reviewed - no intervention indicated at this time  Immunizations: reviewed; up to date     ASSESSMENT/PLAN                                                       (Z00.01) Encounter for routine adult medical exam with abnormal findings  (primary encounter diagnosis)  Comment: PMH, PSH, FH, SH, medications, allergies, immunizations, and preventative health measures reviewed.     (F33.2) Severe episode of recurrent major depressive disorder, without psychotic features (H)  (F41.1) ADDY (generalized anxiety disorder)  (F42.9) Obsessive-compulsive disorder, unspecified type  (F43.10) PTSD (post-traumatic stress disorder)  Comment: severe symptoms currently.  Plan:    - INCREASE Zoloft from 25 to 50 mg daily.   - follow-up in 6-8 weeks (earlier as needed).    The instructions on the AVS were discussed and explained to the patient. Patient expressed understanding of instructions.    (Chart documentation was completed, in part, with [employer] voice-recognition software. Even though reviewed, some grammatical, spelling, and word errors may remain.)    Josselin Diamond MD   [address] [address]  T: [phone], F: [phone]

## 2019-06-28 ASSESSMENT — ANXIETY QUESTIONNAIRES: GAD7 TOTAL SCORE: 18

## 2019-11-01 ENCOUNTER — TELEPHONE (OUTPATIENT)
Dept: INTERNAL MEDICINE | Facility: CLINIC | Age: 29
End: 2019-11-01

## 2019-11-01 NOTE — TELEPHONE ENCOUNTER
Panel Management Review      Patient has the following on her problem list: None      Composite cancer screening  Chart review shows that this patient is due/due soon for the following None  Summary:    Patient is due/failing the following:   PHQ9    Action needed:   Patient needs to do PHQ9.    Type of outreach:    Sent [email or web address] message.    Questions for provider review:    None                                                                                                                                    Flora Caceres MA      Chart routed to self .

## 2020-02-14 ENCOUNTER — TELEPHONE (OUTPATIENT)
Dept: INTERNAL MEDICINE | Facility: CLINIC | Age: 30
End: 2020-02-14

## 2020-02-14 NOTE — TELEPHONE ENCOUNTER
Panel Management Review      Patient has the following on her problem list:     Depression / Dysthymia review    Measure:  Needs PHQ-9 score of 4 or less during index window.  Administer PHQ-9 and if score is 5 or more, send encounter to provider for next steps.    5 - 7 month window range: yes    PHQ-9 SCORE 6/27/2019   PHQ-9 Total Score 27       If PHQ-9 recheck is 5 or more, route to provider for next steps.    Patient is due for:  PHQ9      Composite cancer screening  Chart review shows that this patient is due/due soon for the following None  Summary:    Patient is due/failing the following:   PHQ9    Action needed:   Patient needs to do PHQ9.    Type of outreach:    Sent [employer] message.    Questions for provider review:    None                                                                                                                                    Flora Caceres MA      Chart routed to self .

## 2020-03-02 ENCOUNTER — APPOINTMENT (OUTPATIENT)
Dept: GENERAL RADIOLOGY | Facility: CLINIC | Age: 30
End: 2020-03-02
Attending: PHYSICIAN ASSISTANT
Payer: COMMERCIAL

## 2020-03-02 ENCOUNTER — HOSPITAL ENCOUNTER (EMERGENCY)
Facility: CLINIC | Age: 30
Discharge: HOME | End: 2020-03-02
Attending: PHYSICIAN ASSISTANT | Admitting: PHYSICIAN ASSISTANT
Payer: COMMERCIAL

## 2020-03-02 VITALS
DIASTOLIC BLOOD PRESSURE: 61 MMHG | HEART RATE: 90 BPM | BODY MASS INDEX: 20.83 KG/M2 | WEIGHT: 125 LBS | SYSTOLIC BLOOD PRESSURE: 110 MMHG | RESPIRATION RATE: 16 BRPM | HEIGHT: 65 IN | OXYGEN SATURATION: 99 % | TEMPERATURE: 99.3 F

## 2020-03-02 DIAGNOSIS — J10.1 INFLUENZA A: ICD-10-CM

## 2020-03-02 LAB
DEPRECATED S PYO AG THROAT QL EIA: NEGATIVE
FLUAV+FLUBV AG SPEC QL: NEGATIVE
FLUAV+FLUBV AG SPEC QL: POSITIVE
SPECIMEN SOURCE: ABNORMAL
SPECIMEN SOURCE: NORMAL
SPECIMEN SOURCE: NORMAL
STREP GROUP A PCR: NOT DETECTED

## 2020-03-02 PROCEDURE — 87804 INFLUENZA ASSAY W/OPTIC: CPT | Performed by: PHYSICIAN ASSISTANT

## 2020-03-02 PROCEDURE — 99284 EMERGENCY DEPT VISIT MOD MDM: CPT | Mod: 25

## 2020-03-02 PROCEDURE — 71046 X-RAY EXAM CHEST 2 VIEWS: CPT

## 2020-03-02 PROCEDURE — 25000132 ZZH RX MED GY IP 250 OP 250 PS 637: Performed by: PHYSICIAN ASSISTANT

## 2020-03-02 PROCEDURE — 40001204 ZZHCL STATISTIC STREP A RAPID: Performed by: PHYSICIAN ASSISTANT

## 2020-03-02 PROCEDURE — 87651 STREP A DNA AMP PROBE: CPT | Performed by: PHYSICIAN ASSISTANT

## 2020-03-02 RX ORDER — IBUPROFEN 600 MG/1
600 TABLET, FILM COATED ORAL ONCE
Status: COMPLETED | OUTPATIENT
Start: 2020-03-02 | End: 2020-03-02

## 2020-03-02 RX ORDER — ACETAMINOPHEN 500 MG
1000 TABLET ORAL ONCE
Status: COMPLETED | OUTPATIENT
Start: 2020-03-02 | End: 2020-03-02

## 2020-03-02 RX ORDER — OSELTAMIVIR PHOSPHATE 75 MG/1
75 CAPSULE ORAL 2 TIMES DAILY
Qty: 10 CAPSULE | Refills: 0 | Status: SHIPPED | OUTPATIENT
Start: 2020-03-02 | End: 2020-06-08

## 2020-03-02 RX ADMIN — IBUPROFEN 600 MG: 600 TABLET ORAL at 17:34

## 2020-03-02 RX ADMIN — ACETAMINOPHEN 1000 MG: 500 TABLET, FILM COATED ORAL at 17:34

## 2020-03-02 ASSESSMENT — ENCOUNTER SYMPTOMS
CHILLS: 1
HEMATURIA: 0
APPETITE CHANGE: 1
NECK PAIN: 1
COUGH: 1
DIARRHEA: 0
DIZZINESS: 1
SORE THROAT: 1
NAUSEA: 0
FEVER: 1
DYSURIA: 0
HALLUCINATIONS: 1
VOMITING: 0

## 2020-03-02 ASSESSMENT — MIFFLIN-ST. JEOR: SCORE: 1277.88

## 2020-03-02 NOTE — ED PROVIDER NOTES
History     Chief Complaint:  Flu Symptoms    HPI   Diana Altamirano is a 30 year old female who presents to the emergency department for evaluation of flu symptoms. The patient was at  and then was sent here because her fever was 103. The patient reports neck pain, fever, chills, cough, dizziness, sore throat, decreased appetite, and reported visual hallucinations(now resolved--she attributes to Nyquil). She has since been taking Advil most recently at 6am. She has been able to tolerate fluids and denies vomiting or diarrhea. The patient's  has been ill recently with similar symptoms and is now feeling much improved. The patient and her  have recently traveled to [place], and just got back last weekend. The patient denies nausea, vomiting, urinary symptoms, or diarrhea. She has no further concerns at this time.     Allergies:  No known drug allergies     Medications:    Valium  Zoloft  Ativan    Past Medical History:    ADDY  MDD  OCD  Anorexia  PTSD    Past Surgical History:    The patient does not have any pertinent past surgical history.    Family History:      Mental health    Social History:  The patient presents today with her .  Never smoker  Positive for alcohol use.   Negative for drug use.  Marital Status:      Review of Systems   Constitutional: Positive for appetite change, chills and fever.   HENT: Positive for sore throat.    Respiratory: Positive for cough.    Gastrointestinal: Negative for diarrhea, nausea and vomiting.   Genitourinary: Negative for dysuria and hematuria.   Musculoskeletal: Positive for neck pain.   Neurological: Positive for dizziness.   Psychiatric/Behavioral: Positive for hallucinations.   All other systems reviewed and are negative.    Physical Exam     Patient Vitals for the past 24 hrs:   BP Temp Temp src Pulse Resp SpO2 Height Weight   03/02/20 1612 96/79 -- -- -- -- -- -- --   03/02/20 1611 -- 99.3  F (37.4  C) Oral 110 18 100 % 1.651 m (5'  "5\") 56.7 kg (125 lb)     Physical Exam  General: Resting comfortably.  Alert and oriented.   Head:  The scalp, face, and head appear normal   Eyes:  Conjunctivae and sclerae are normal    ENT:    The oropharynx is normal     Uvula is in the midline       Structures are symmetric. No tonsillar hypertrophy or exudate.     Bilateral TMs are normal without evidence of infection   Neck:  No lymphadenopathy   CV:  Regular rate and rhythm     Normal S1/S2   Resp:  Lungs are clear to auscultation    Non-labored    No rales or wheezing   MS:  Normal muscular tone   Skin:  No rash or acute skin lesions noted   Neuro: Speech is normal and fluent.     Emergency Department Course     Imaging:  Radiology findings were communicated with the patient who voiced understanding of the findings.    Chest XR, PA & LAT:  Since September 16, 2017, heart size remains normal. No  pleural effusion, pneumothorax, or abnormal area of consolidation.   As per radiology.    Laboratory:  Laboratory findings were communicated with the patient who voiced understanding of the findings.    Influenza A/B antigen: Influenza A Positive, Influenza B Negative    Streptococcus A Rapid Scr w Reflx to PCR Negative    Group A Streptococcus PCR Throat Swab In process      Interventions:  1734 Advil 600 mg PO    1734 Tylenol 1000 mg PO    Emergency Department Course:    1642 Nursing notes and vitals reviewed.    1647 I performed an exam of the patient as documented above.     1712 A nasopharyngeal sample was obtained for laboratory testing as documented above.    1712 A throat sample was obtained for laboratory testing as documented above.    1749 The patient was sent for a Chest XR, PA & LAT while in the emergency department, results above.     1930 Patient rechecked and updated.     Findings and plan explained to the Patient. Patient discharged home with instructions regarding supportive care, medications, and reasons to return. The importance of close " follow-up was reviewed. The patient was prescribed Tamiflu.    Impression & Plan     Medical Decision Making:  Diana Altamirano is a 30 year old female who presents for evaluation of cough, fever and myalgias.  This is consistent with influenza and testing came back positive for influenza A. The patient is within the treatment window and therefore was prescribed Tamiflu. She understands the adverse side effects of this medicine.  CXR was unremarkable. Rapid strep is negative with PCR pending. The patient understands she will only receive a call if the PCR test comes back positive and she will be started on antimicrobial therapy at that time.  Overall, I believe the patient is safe to be discharged home.  No indications for further work-up at this time.   There is no signs of serious bacterial infection such as bacteremia, meningitis, UTI/pyelonephritis, etc. symptomatic cares including Tylenol ibuprofen for the fever, increase rest and hydration, diet as tolerated, etc. were discussed and understood by the patient.  She will follow-up in the next 2 to 3 days with her primary doctor.  She will otherwise return immediately for any uncontrolled fevers, worsening symptoms, shortness of breath, confusion, or any other concerns.  All questions were answered prior to discharge.  The patient understands and agrees with plan.    Discharge Diagnosis:    ICD-10-CM    1. Influenza A J10.1      Disposition:  The patient is discharged to home.     Discharge Medications:  New Prescriptions    OSELTAMIVIR (TAMIFLU) 75 MG CAPSULE    Take 1 capsule (75 mg) by mouth 2 times daily for 5 days     Scribe Disclosure:  Cong YU, am serving as a scribe at 4:49 PM on 3/2/2020 to document services personally performed by Santa Alaniz PA based on my observations and the provider's statements to me.      Santa Alaniz PA-C  03/02/20 [id number]

## 2020-03-02 NOTE — ED AVS SNAPSHOT
Emergency Department  [address] [facility] [address]  Phone:  [phone]  Fax:  [phone]                                    Diana Altamirano   MRN: [id number]    Department:   Emergency Department   Date of Visit:  3/2/2020           After Visit Summary Signature Page    I have received my discharge instructions, and my questions have been answered. I have discussed any challenges I see with this plan with the nurse or doctor.    ..........................................................................................................................................  Patient/Patient Representative Signature      ..........................................................................................................................................  Patient Representative Print Name and Relationship to Patient    ..................................................               ................................................  Date                                   Time    ..........................................................................................................................................  Reviewed by Signature/Title    ...................................................              ..............................................  Date                                               Time          22EPIC Rev 08/18

## 2020-03-02 NOTE — LETTER
March 2, 2020      To Whom It May Concern:      Diana Altamirano was seen in our Emergency Department today, 03/02/20. Please excuse her from work from 3/3-3/4. I expect her condition to improve over the next 3-4 days.  She may return to work/school when improved.    Sincerely,        Santa Alaniz PA-C

## 2020-03-03 NOTE — RESULT ENCOUNTER NOTE
Final Beta strep group A r/o culture is NEGATIVE for Group A streptococcus.    No treatment or change in treatment per [facility] Strep protocol.

## 2020-03-11 ENCOUNTER — HEALTH MAINTENANCE LETTER (OUTPATIENT)
Age: 30
End: 2020-03-11

## 2020-06-08 ENCOUNTER — HOSPITAL ENCOUNTER (EMERGENCY)
Facility: CLINIC | Age: 30
Discharge: HOME | End: 2020-06-09
Attending: EMERGENCY MEDICINE | Admitting: EMERGENCY MEDICINE
Payer: COMMERCIAL

## 2020-06-08 DIAGNOSIS — F41.9 ANXIETY: ICD-10-CM

## 2020-06-08 PROCEDURE — 25000132 ZZH RX MED GY IP 250 OP 250 PS 637: Performed by: EMERGENCY MEDICINE

## 2020-06-08 PROCEDURE — 99285 EMERGENCY DEPT VISIT HI MDM: CPT | Mod: 25

## 2020-06-08 PROCEDURE — 90791 PSYCH DIAGNOSTIC EVALUATION: CPT

## 2020-06-08 RX ORDER — LORAZEPAM 0.5 MG/1
0.5 TABLET ORAL EVERY 6 HOURS PRN
Qty: 14 TABLET | Refills: 0 | Status: ON HOLD | OUTPATIENT
Start: 2020-06-08 | End: 2020-06-19

## 2020-06-08 RX ORDER — DIAZEPAM 5 MG
5 TABLET ORAL ONCE
Status: COMPLETED | OUTPATIENT
Start: 2020-06-08 | End: 2020-06-08

## 2020-06-08 RX ADMIN — DIAZEPAM 5 MG: 5 TABLET ORAL at 21:54

## 2020-06-08 ASSESSMENT — ENCOUNTER SYMPTOMS: NERVOUS/ANXIOUS: 1

## 2020-06-08 NOTE — ED AVS SNAPSHOT
Emergency Department  [address] [facility] [address]  Phone:  [phone]  Fax:  [phone]                                    Diana Altamirano   MRN: [id number]    Department:   Emergency Department   Date of Visit:  6/8/2020           After Visit Summary Signature Page    I have received my discharge instructions, and my questions have been answered. I have discussed any challenges I see with this plan with the nurse or doctor.    ..........................................................................................................................................  Patient/Patient Representative Signature      ..........................................................................................................................................  Patient Representative Print Name and Relationship to Patient    ..................................................               ................................................  Date                                   Time    ..........................................................................................................................................  Reviewed by Signature/Title    ...................................................              ..............................................  Date                                               Time          22EPIC Rev 08/18

## 2020-06-09 VITALS
TEMPERATURE: 98.2 F | RESPIRATION RATE: 20 BRPM | SYSTOLIC BLOOD PRESSURE: 130 MMHG | DIASTOLIC BLOOD PRESSURE: 75 MMHG | OXYGEN SATURATION: 100 % | HEART RATE: 85 BPM

## 2020-06-09 NOTE — ED NOTES
Patient brought into ED by mother. Patient states she is in a mental health crisis. Mother reports pt saw her therapist today and they suggested she come into the ED to possibly be admitted. Mother reports that patient's  left her today. Mother can be reached at [phone] [facility].

## 2020-06-09 NOTE — DISCHARGE INSTRUCTIONS
Discharge Instructions  Mental Health Concerns    You were seen today for mental health concerns, such as depression, anxiety, or suicidal thinking. Your provider feels that you do not require hospitalization at this time. However, your symptoms may become worse, and you may need to return to the Emergency Department. Most treatments of depression and suicidal thoughts are a process rather than a single intervention.  Medications and counseling can take several weeks or more to help.    Generally, every Emergency Department visit should have a follow-up clinic visit with either a primary or a specialty clinic/provider. Please follow-up as instructed by your emergency provider today.    By accepting these discharge instructions:  You promise to not harm yourself or others.  You agree that if you feel you are becoming unable to keep that promise, you will do something to help yourself before you do anything to harm yourself or others.   You agree to keep any safety plan arranged on your visit here today.  You agree to take any medication prescribed or recommended by your provider.  If you are getting worse, you can contact a friend or a family member, contact your counselor or family provider, contact a crisis line, or other options discussed with the provider or therapist today.  At any time, you can call 911 and return to the Emergency Department for more help.  You understand that follow-up is essential to your treatment, and you will make and keep appointments recommended on your visit today.    How to improve your mental health and prevent suicide:  Involve others by letting family, friends, counselors know.  Do not isolate yourself.  Avoid alcohol or drugs. Remove weapons, poisons from your home.  Try to stick to routines for eating, sleeping and getting regular exercise.    Try to get into sunlight. Bright natural light not only treats seasonal affective disorder but also depression.  Increase safe activities  that you enjoy.    If you feel worse, contact 1-800-suicide (1-300.648.2741), or call 911, or your primary provider/counselor for additional assistance.    If you were given a prescription for medicine here today, be sure to read all of the information (including the package insert) that comes with your prescription.  This will include important information about the medicine, its side effects, and any warnings that you need to know about.  The pharmacist who fills the prescription can provide more information and answer questions you may have about the medicine.  If you have questions or concerns that the pharmacist cannot address, please call or return to the Emergency Department.   Remember that you can always come back to the Emergency Department if you are not able to see your regular provider in the amount of time listed above, if you get any new symptoms, or if there is anything that worries you.

## 2020-06-09 NOTE — ED PROVIDER NOTES
Patient signed out to me pending DEC assessment    Spoke to DEC who recommended outpatient management    Pt will have her mom stay with her. Dr. Burciaga wrote a prescription for ativan for home. Psychiatry appt scheduled for Thursday 0034: Requested to see patient again. patient continues to be anxious, tearful. May need psychiatric admission    0046: Patient now saying she wants to go home with her mom. Understands that she can return if gets worse     Yajaira Amor MD  06/09/20 0001       Yajaira Amor MD  06/09/20 0047

## 2020-06-09 NOTE — ED TRIAGE NOTES
"Overwhelming ocd and  just left her today. Pt crying in triage. \"I want to die but I don't want to be the one killing me\"    Pt A&O x 3, CMS x 3, ABCD's adequate in triage    "

## 2020-06-09 NOTE — ED PROVIDER NOTES
History     Chief Complaint:  Mental Health Crisis    The history is provided by the patient.      Diana Altamirano is a 30 year old female with a history of OCD and anxiety who presents with a mental health crisis. Since COVID started the patient's OCD symptoms have severely escalated. The patient states that anything related to dirt or might have COVID-19 on it needs to be bleached. She was recently called back to work in  at [employer] which is stressful due to her OCD. She was dressed in full rain gear in the excessive heat today and her boss yelled at her.   Also, she got back together with her  and have been living together for the past year. She reports he has been under more stress in dealing with her increased OCD and anxiety but they had been going to therapy to try to work through their problems. This morning she stated that her  said he would see her after work, but when she came back he was packed up and gone. She states that this similar action occurred when they were going through their divorce years ago. Patient reported that she called her therapist today who told her to seek evaluation at the ER and get medication to help for a few day. She denies suicidal ideations but feels like she doesn't want to be alive at times. She is also not on any medications.     Allergies:  No Known Allergies    Medications:    Zoloft    Past Medical History:    Anorexia  Depression  Anxiety  OCD  PTSD    Past Surgical History:    The patient does not have any pertinent past surgical history.    Family History:    Sister: Mental health  Mother: Parkinson's disease    Social History:  The patient was accompanied to the ED by her mother.  Smoking Status: Never Smoker  Smokeless Tobacco: Never Used  Alcohol Use: Positive  Drug Use: Negative  PCP: Josselin Diamond    Marital Status:       Review of Systems   Psychiatric/Behavioral: Negative for suicidal ideas. The patient is  nervous/anxious.    All other systems reviewed and are negative.      Physical Exam     Patient Vitals for the past 24 hrs:   BP Temp Temp src Pulse Resp SpO2   06/08/20 2119 135/80 98.2  F (36.8  C) Temporal 105 20 96 %       Physical Exam  GENERAL: well developed, pleasant. Very anxious. Sitting on the bed with legs genaro crossed.  HEAD: atraumatic  EYES: pupils reactive, extraocular muscles intact, conjunctivae normal  ENT:  mucus membranes moist  NECK:  trachea midline, normal range of motion  RESPIRATORY: no tachypnea, breath sounds clear to auscultation   CVS: normal S1/S2, no murmurs, intact distal pulses  ABDOMEN: soft, nontender, nondistention  MUSCULOSKELETAL: no deformities  SKIN: warm and dry, no acute rashes or ulceration  NEURO: GCS 15, cranial nerves intact, alert and oriented x3  PSYCH:  Mood/affect normal    Emergency Department Course     Interventions:  2154 Valium 5 mg PO    Emergency Department Course:  Past medical records, nursing notes, and vitals reviewed.    2129 I performed an exam of the patient as documented above.     2303 I rechecked the patient and discussed the results of her workup thus far.     The patient was signed out to Dr. Amor, [facility] ED physician, pending DEC assessment.    I personally answered all related questions prior to sign out.     Impression & Plan     Medical Decision Making:    Patient presents with worsening OCD symptoms since COVID.  She is not suicidal or homicidal.  Currently awaiting DEC assessment.  Case is signed out to my partner awaiting DEC and disposition.  She is not psychotic.    Diagnosis:    ICD-10-CM    1. Anxiety  F41.9        Disposition:  Signed out to Dr. Amor, pending DEC assessment.    Discharge Medications:  New Prescriptions    LORAZEPAM (ATIVAN) 0.5 MG TABLET    Take 1 tablet (0.5 mg) by mouth every 6 hours as needed for anxiety       Scribe Disclosure:  I, Jonathan Neff, am serving as a scribe at 9:34 PM on 6/8/2020 to document  services personally performed by Forrest Burciaga MD, based on my observations and the provider's statements to me.     I, Francia Cosby, am serving as a scribe on 6/8/2020 at 10:44 PM to personally document services performed by Forrest Burciaga MD based on my observations and the provider's statements to me.        Forrest Burciaga MD  07/06/20 142

## 2020-06-13 ENCOUNTER — NURSE TRIAGE (OUTPATIENT)
Dept: NURSING | Facility: CLINIC | Age: 30
End: 2020-06-13

## 2020-06-13 NOTE — TELEPHONE ENCOUNTER
Diana gave verbal consent for FNA to speak with Mom Karyn.    Diana was seen at the ED on Monday and was prescribed lorazepam.     Followed up with [facility] in [place], with Eusebio Rojas NP, and was prescribed with lorazepam. She was told that prescription was sent to [facility] [address] [place], but was not received by the pharmacy.    Mom asks if [facility] ED/PCP can call in a refill of lorazepam. FNA answered no. Explained that ED does not refill medications and since this was not prescribed by an [facility] provider, unable to lila request. Ativan is also not refilled after hours.    FNA called [facility] and prescription not at that location. FNA advised Karyn to call [facility]'s and she verbalized understanding.    Venita Aguilar RN/[facility] Nurse Advisor        Reason for Disposition    Caller requesting a NON-URGENT new prescription or refill and triager unable to refill per unit policy    Protocols used: MEDICATION QUESTION CALL-A-

## 2020-06-15 ENCOUNTER — HOSPITAL ENCOUNTER (EMERGENCY)
Facility: CLINIC | Age: 30
Discharge: PSYCHIATRIC OR SUBSTANCE USE FACILITY | End: 2020-06-15
Attending: EMERGENCY MEDICINE | Admitting: EMERGENCY MEDICINE
Payer: COMMERCIAL

## 2020-06-15 ENCOUNTER — TELEPHONE (OUTPATIENT)
Dept: BEHAVIORAL HEALTH | Facility: CLINIC | Age: 30
End: 2020-06-15

## 2020-06-15 VITALS
SYSTOLIC BLOOD PRESSURE: 132 MMHG | TEMPERATURE: 99 F | OXYGEN SATURATION: 98 % | BODY MASS INDEX: 18.6 KG/M2 | DIASTOLIC BLOOD PRESSURE: 84 MMHG | RESPIRATION RATE: 20 BRPM | HEART RATE: 125 BPM | WEIGHT: 111.8 LBS

## 2020-06-15 DIAGNOSIS — F42.9 OBSESSIVE-COMPULSIVE DISORDER, UNSPECIFIED TYPE: ICD-10-CM

## 2020-06-15 DIAGNOSIS — F41.0 ANXIETY ATTACK: ICD-10-CM

## 2020-06-15 PROCEDURE — U0003 INFECTIOUS AGENT DETECTION BY NUCLEIC ACID (DNA OR RNA); SEVERE ACUTE RESPIRATORY SYNDROME CORONAVIRUS 2 (SARS-COV-2) (CORONAVIRUS DISEASE [COVID-19]), AMPLIFIED PROBE TECHNIQUE, MAKING USE OF HIGH THROUGHPUT TECHNOLOGIES AS DESCRIBED BY CMS-2020-01-R: HCPCS | Performed by: EMERGENCY MEDICINE

## 2020-06-15 PROCEDURE — 99285 EMERGENCY DEPT VISIT HI MDM: CPT | Mod: 25

## 2020-06-15 PROCEDURE — C9803 HOPD COVID-19 SPEC COLLECT: HCPCS

## 2020-06-15 PROCEDURE — 90791 PSYCH DIAGNOSTIC EVALUATION: CPT

## 2020-06-15 RX ORDER — LORAZEPAM 0.5 MG/1
0.5 TABLET ORAL EVERY 6 HOURS PRN
Status: DISCONTINUED | OUTPATIENT
Start: 2020-06-15 | End: 2020-06-17

## 2020-06-15 RX ORDER — OLANZAPINE 10 MG/1
10 TABLET ORAL
Status: DISCONTINUED | OUTPATIENT
Start: 2020-06-15 | End: 2020-06-17

## 2020-06-15 RX ORDER — HYDROXYZINE HYDROCHLORIDE 25 MG/1
25 TABLET, FILM COATED ORAL EVERY 4 HOURS PRN
Status: DISCONTINUED | OUTPATIENT
Start: 2020-06-15 | End: 2020-06-17

## 2020-06-15 RX ORDER — TRAZODONE HYDROCHLORIDE 50 MG/1
50 TABLET, FILM COATED ORAL
Status: DISCONTINUED | OUTPATIENT
Start: 2020-06-15 | End: 2020-06-16

## 2020-06-15 RX ORDER — POLYETHYLENE GLYCOL 3350 17 G
2-4 POWDER IN PACKET (EA) ORAL
Status: DISCONTINUED | OUTPATIENT
Start: 2020-06-15 | End: 2020-06-19 | Stop reason: HOSPADM

## 2020-06-15 RX ORDER — ALUMINA, MAGNESIA, AND SIMETHICONE 2400; 2400; 240 MG/30ML; MG/30ML; MG/30ML
30 SUSPENSION ORAL EVERY 4 HOURS PRN
Status: DISCONTINUED | OUTPATIENT
Start: 2020-06-15 | End: 2020-06-19 | Stop reason: HOSPADM

## 2020-06-15 RX ORDER — OLANZAPINE 10 MG/2ML
10 INJECTION, POWDER, FOR SOLUTION INTRAMUSCULAR
Status: DISCONTINUED | OUTPATIENT
Start: 2020-06-15 | End: 2020-06-17

## 2020-06-15 RX ORDER — ACETAMINOPHEN 325 MG/1
650 TABLET ORAL EVERY 4 HOURS PRN
Status: DISCONTINUED | OUTPATIENT
Start: 2020-06-15 | End: 2020-06-19 | Stop reason: HOSPADM

## 2020-06-15 ASSESSMENT — ENCOUNTER SYMPTOMS: NERVOUS/ANXIOUS: 1

## 2020-06-16 ENCOUNTER — HOSPITAL ENCOUNTER (INPATIENT)
Facility: CLINIC | Age: 30
LOS: 3 days | Discharge: HOME | DRG: 882 | End: 2020-06-19
Attending: PSYCHIATRY & NEUROLOGY | Admitting: PSYCHIATRY & NEUROLOGY
Payer: COMMERCIAL

## 2020-06-16 DIAGNOSIS — F41.1 GAD (GENERALIZED ANXIETY DISORDER): ICD-10-CM

## 2020-06-16 DIAGNOSIS — R63.0 ANOREXIA: ICD-10-CM

## 2020-06-16 DIAGNOSIS — F42.9 OBSESSIVE-COMPULSIVE DISORDER, UNSPECIFIED TYPE: Primary | ICD-10-CM

## 2020-06-16 LAB
ALBUMIN SERPL-MCNC: 3.9 G/DL (ref 3.4–5)
ALP SERPL-CCNC: 38 U/L (ref 40–150)
ALT SERPL W P-5'-P-CCNC: 29 U/L (ref 0–50)
AMPHETAMINES UR QL SCN: NEGATIVE
ANION GAP SERPL CALCULATED.3IONS-SCNC: 11 MMOL/L (ref 3–14)
AST SERPL W P-5'-P-CCNC: 33 U/L (ref 0–45)
BARBITURATES UR QL: NEGATIVE
BASOPHILS # BLD AUTO: 0 10E9/L (ref 0–0.2)
BASOPHILS NFR BLD AUTO: 0.5 %
BENZODIAZ UR QL: POSITIVE
BILIRUB DIRECT SERPL-MCNC: 0.3 MG/DL (ref 0–0.2)
BILIRUB SERPL-MCNC: 1.1 MG/DL (ref 0.2–1.3)
BUN SERPL-MCNC: 18 MG/DL (ref 7–30)
CALCIUM SERPL-MCNC: 9.1 MG/DL (ref 8.5–10.1)
CANNABINOIDS UR QL SCN: NEGATIVE
CHLORIDE SERPL-SCNC: 104 MMOL/L (ref 94–109)
CHOLEST SERPL-MCNC: 144 MG/DL
CO2 SERPL-SCNC: 22 MMOL/L (ref 20–32)
COCAINE UR QL: NEGATIVE
CREAT SERPL-MCNC: 0.64 MG/DL (ref 0.52–1.04)
DIFFERENTIAL METHOD BLD: NORMAL
EOSINOPHIL # BLD AUTO: 0.2 10E9/L (ref 0–0.7)
EOSINOPHIL NFR BLD AUTO: 2.5 %
ERYTHROCYTE [DISTWIDTH] IN BLOOD BY AUTOMATED COUNT: 12.3 % (ref 10–15)
ETHANOL UR QL SCN: NEGATIVE
GFR SERPL CREATININE-BSD FRML MDRD: >90 ML/MIN/{1.73_M2}
GLUCOSE SERPL-MCNC: 67 MG/DL (ref 70–99)
HCG UR QL: NEGATIVE
HCT VFR BLD AUTO: 39.1 % (ref 35–47)
HDLC SERPL-MCNC: 55 MG/DL
HGB BLD-MCNC: 13.4 G/DL (ref 11.7–15.7)
IMM GRANULOCYTES # BLD: 0 10E9/L (ref 0–0.4)
IMM GRANULOCYTES NFR BLD: 0.2 %
INTERPRETATION ECG - MUSE: NORMAL
LDLC SERPL CALC-MCNC: 78 MG/DL
LYMPHOCYTES # BLD AUTO: 2.9 10E9/L (ref 0.8–5.3)
LYMPHOCYTES NFR BLD AUTO: 45.6 %
MAGNESIUM SERPL-MCNC: 1.7 MG/DL (ref 1.6–2.3)
MCH RBC QN AUTO: 29.3 PG (ref 26.5–33)
MCHC RBC AUTO-ENTMCNC: 34.3 G/DL (ref 31.5–36.5)
MCV RBC AUTO: 86 FL (ref 78–100)
MONOCYTES # BLD AUTO: 0.8 10E9/L (ref 0–1.3)
MONOCYTES NFR BLD AUTO: 11.8 %
NEUTROPHILS # BLD AUTO: 2.5 10E9/L (ref 1.6–8.3)
NEUTROPHILS NFR BLD AUTO: 39.4 %
NONHDLC SERPL-MCNC: 89 MG/DL
NRBC # BLD AUTO: 0 10*3/UL
NRBC BLD AUTO-RTO: 0 /100
OPIATES UR QL SCN: NEGATIVE
PCP UR QL SCN: NEGATIVE
PHOSPHATE SERPL-MCNC: 3.6 MG/DL (ref 2.5–4.5)
PLATELET # BLD AUTO: 193 10E9/L (ref 150–450)
POTASSIUM SERPL-SCNC: 3.2 MMOL/L (ref 3.4–5.3)
PROT SERPL-MCNC: 7.6 G/DL (ref 6.8–8.8)
RBC # BLD AUTO: 4.57 10E12/L (ref 3.8–5.2)
SARS-COV-2 RNA SPEC QL NAA+PROBE: NOT DETECTED
SODIUM SERPL-SCNC: 137 MMOL/L (ref 133–144)
SPECIMEN SOURCE: NORMAL
TRIGL SERPL-MCNC: 56 MG/DL
TSH SERPL DL<=0.005 MIU/L-ACNC: 0.75 MU/L (ref 0.4–4)
WBC # BLD AUTO: 6.4 10E9/L (ref 4–11)

## 2020-06-16 PROCEDURE — 81025 URINE PREGNANCY TEST: CPT | Performed by: STUDENT IN AN ORGANIZED HEALTH CARE EDUCATION/TRAINING PROGRAM

## 2020-06-16 PROCEDURE — H2032 ACTIVITY THERAPY, PER 15 MIN: HCPCS

## 2020-06-16 PROCEDURE — 12400001 ZZH R&B MH UMMC

## 2020-06-16 PROCEDURE — 36415 COLL VENOUS BLD VENIPUNCTURE: CPT | Performed by: STUDENT IN AN ORGANIZED HEALTH CARE EDUCATION/TRAINING PROGRAM

## 2020-06-16 PROCEDURE — G0177 OPPS/PHP; TRAIN & EDUC SERV: HCPCS

## 2020-06-16 PROCEDURE — 80053 COMPREHEN METABOLIC PANEL: CPT | Performed by: STUDENT IN AN ORGANIZED HEALTH CARE EDUCATION/TRAINING PROGRAM

## 2020-06-16 PROCEDURE — 99222 1ST HOSP IP/OBS MODERATE 55: CPT | Mod: AI | Performed by: PSYCHIATRY & NEUROLOGY

## 2020-06-16 PROCEDURE — 80320 DRUG SCREEN QUANTALCOHOLS: CPT | Performed by: STUDENT IN AN ORGANIZED HEALTH CARE EDUCATION/TRAINING PROGRAM

## 2020-06-16 PROCEDURE — 84100 ASSAY OF PHOSPHORUS: CPT | Performed by: STUDENT IN AN ORGANIZED HEALTH CARE EDUCATION/TRAINING PROGRAM

## 2020-06-16 PROCEDURE — 84443 ASSAY THYROID STIM HORMONE: CPT | Performed by: STUDENT IN AN ORGANIZED HEALTH CARE EDUCATION/TRAINING PROGRAM

## 2020-06-16 PROCEDURE — 93010 ELECTROCARDIOGRAM REPORT: CPT | Performed by: INTERNAL MEDICINE

## 2020-06-16 PROCEDURE — 82248 BILIRUBIN DIRECT: CPT | Performed by: STUDENT IN AN ORGANIZED HEALTH CARE EDUCATION/TRAINING PROGRAM

## 2020-06-16 PROCEDURE — 80061 LIPID PANEL: CPT | Performed by: STUDENT IN AN ORGANIZED HEALTH CARE EDUCATION/TRAINING PROGRAM

## 2020-06-16 PROCEDURE — 93005 ELECTROCARDIOGRAM TRACING: CPT

## 2020-06-16 PROCEDURE — 85025 COMPLETE CBC W/AUTO DIFF WBC: CPT | Performed by: STUDENT IN AN ORGANIZED HEALTH CARE EDUCATION/TRAINING PROGRAM

## 2020-06-16 PROCEDURE — 80307 DRUG TEST PRSMV CHEM ANLYZR: CPT | Performed by: STUDENT IN AN ORGANIZED HEALTH CARE EDUCATION/TRAINING PROGRAM

## 2020-06-16 PROCEDURE — 25000132 ZZH RX MED GY IP 250 OP 250 PS 637: Performed by: STUDENT IN AN ORGANIZED HEALTH CARE EDUCATION/TRAINING PROGRAM

## 2020-06-16 PROCEDURE — 83735 ASSAY OF MAGNESIUM: CPT | Performed by: STUDENT IN AN ORGANIZED HEALTH CARE EDUCATION/TRAINING PROGRAM

## 2020-06-16 RX ORDER — SERTRALINE HCL 25 MG
12.5 TABLET ORAL DAILY
Status: DISCONTINUED | OUTPATIENT
Start: 2020-06-16 | End: 2020-06-17

## 2020-06-16 RX ORDER — SERTRALINE HCL 25 MG
12.5 TABLET ORAL DAILY
Status: CANCELLED | OUTPATIENT
Start: 2020-06-16

## 2020-06-16 RX ADMIN — HYDROXYZINE HYDROCHLORIDE 25 MG: 25 TABLET, FILM COATED ORAL at 00:42

## 2020-06-16 RX ADMIN — LORAZEPAM 0.5 MG: 0.5 TABLET ORAL at 22:09

## 2020-06-16 RX ADMIN — Medication 12.5 MG: at 16:47

## 2020-06-16 RX ADMIN — HYDROXYZINE HYDROCHLORIDE 25 MG: 25 TABLET, FILM COATED ORAL at 09:48

## 2020-06-16 RX ADMIN — TRAZODONE HYDROCHLORIDE 50 MG: 50 TABLET ORAL at 00:49

## 2020-06-16 ASSESSMENT — ACTIVITIES OF DAILY LIVING (ADL)
RETIRED_EATING: 0-->INDEPENDENT
AMBULATION: 0-->INDEPENDENT
FALL_HISTORY_WITHIN_LAST_SIX_MONTHS: NO
DRESS: INDEPENDENT
RETIRED_COMMUNICATION: 0-->UNDERSTANDS/COMMUNICATES WITHOUT DIFFICULTY
BATHING: 0-->INDEPENDENT
HYGIENE/GROOMING: INDEPENDENT
LAUNDRY: WITH SUPERVISION
WHICH_OF_THE_ABOVE_FUNCTIONAL_RISKS_HAD_A_RECENT_ONSET_OR_CHANGE?: COGNITION
HYGIENE/GROOMING: INDEPENDENT
SWALLOWING: 0-->SWALLOWS FOODS/LIQUIDS WITHOUT DIFFICULTY
DRESS: 0-->INDEPENDENT
LAUNDRY: UNABLE TO COMPLETE
TOILETING: 0-->INDEPENDENT
ORAL_HYGIENE: INDEPENDENT
TRANSFERRING: 0-->INDEPENDENT
COGNITION: 0 - NO COGNITION ISSUES REPORTED
DRESS: SCRUBS (BEHAVIORAL HEALTH)
ORAL_HYGIENE: INDEPENDENT

## 2020-06-16 ASSESSMENT — MIFFLIN-ST. JEOR: SCORE: 1220.89

## 2020-06-16 NOTE — PROGRESS NOTES
"CLINICAL NUTRITION SERVICES - ASSESSMENT NOTE     Nutrition Prescription    RECOMMENDATIONS FOR MDs/PROVIDERS TO ORDER:  Consider MVI as appropriate     Malnutrition Status:    Unable to determine due to no NFPA    Recommendations already ordered by Registered Dietitian (RD):  Boost Plus TID at all meals   Update weight order to weekly     Future/Additional Recommendations:  Monitor meal intakes and supplement acceptance  Monitor weight trends   Follow up with pt visit      REASON FOR ASSESSMENT  Diana Altamirano is a/an 30 year old female assessed by the dietitian for Admission Nutrition Risk Screen for reduced oral intake over the last month    NUTRITION/MEDICAL HISTORY  Per chart review: Pt admits to facility in the setting of worsening OCD symptoms. Past medical history of OCD, anxiety, anorexia, depression, and PTSD noted.    No face to face visit in light of reduced in-person exposure during COVID outbreak. Pt is newly admitted so assessment is per chart review only today.     CURRENT NUTRITION ORDERS  Diet: Regular  Intake/Tolerance: Nothing recorded thus far yet     LABS  Reviewed    MEDICATIONS  Reviewed     ANTHROPOMETRICS  Height: 166 cm (5' 5.354\")  Most Recent Weight: 50.4 kg (111 lb 3.2 oz)    IBW: 56.8 kg  BMI: Underweight BMI <18.5  Weight History:   Wt Readings from Last 10 Encounters:   06/16/20 50.4 kg (111 lb 3.2 oz)   06/15/20 50.7 kg (111 lb 12.8 oz)   03/02/20 56.7 kg (125 lb)   06/27/19 55.9 kg (123 lb 4.8 oz)   01/18/19 54.4 kg (120 lb)     Weight assessment: Significant 11.2% weight loss in 3 months    Dosing Weight: 50.4 kg    ASSESSED NUTRITION NEEDS  Estimated Energy Needs: 1765 kcals/day (35 kcals/kg)  Justification: Underweight  Estimated Protein Needs: 50-60 grams protein/day (1 - 1.2 grams of pro/kg)  Justification: Repletion  Estimated Fluid Needs: 1765 mL/day (35 mL/kg)   Justification: Maintenance    MALNUTRITION  % Intake: Decreased intake does not meet criteria  % Weight Loss: > " 7.5% in 3 months (severe)  Subcutaneous Fat Loss: Unable to assess  Muscle Loss: Unable to assess  Fluid Accumulation/Edema: Unable to assess  Malnutrition Diagnosis: Unable to determine due to no NFPA    NUTRITION DIAGNOSIS  Inadequate energy intake related to mental status as evidenced by staff note reports and significant weight loss of >7.5% in 3 months       INTERVENTIONS  Implementation  Medical food supplement therapy     Goals  Patient to consume % of nutritionally adequate meal trays TID, or the equivalent with supplements/snacks.     Monitoring/Evaluation  Progress toward goals will be monitored and evaluated per protocol.

## 2020-06-16 NOTE — TELEPHONE ENCOUNTER
S: Anjel, [facility] ED, 30/F, OCD     B: Hx of OCD  Pt came in a week ago and declined any supports, pt came back today tearful,   Washing everything in her house, family will touch something and she will yell at them, rituals among washing things  Pt denies SI, HI  Pt reports not eating, having racing thoughts, compulsions to wash everything, pt refused to touch the iPad for telehealth assessment  Hx of meds - pt has not been off meds for the last year     Medically cleared, eating, drinking, ambulating independently   Patient cleared and ready for behavioral bed placement: Yes   No covid symptoms, covid swab not ordered at this time     A: Voluntary     R: 22/Eri    848pm - intake called ED to request covid swab be ordered, pt has no symptoms   covid swab ordered   858pm - On call resident pagedKristy  905pm - Kristy accepts  Pt placed in que   909pm - unit charge unavailable, ELSA Ortiz expressed she would let charge RN know and charge will call intake back with a good time for RN to RN.\  925pm - unit charge, ready for report   926pm - ED notified     955pm - Intake spoke with RNJason. Pt informed him that their coverage doesn't work with [facility] and the pt would like to be placed at [facility]. Intake expressed that because [facility] has a bed available and the pt has been accepted that our role in the process is complete and if the ED would like to seek placement outside of the [facility] they are welcome to do so.
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
## 2020-06-16 NOTE — PROGRESS NOTES
Pt present and active in milieu this evening. Pt appears overwhelmed by admission process and was very thankful to staff answering questions about how the unit ran during the day. Pt's affect is blunted mood is somewhat depressed. Pt denies SI, SIB, HI, AH, VH. Pt was conversing with another pt around lunch time and mood seemed to ease. Pt has spent a lot of time today checking in with drs. Today. Pt has had no behavioral concerns to report this shift and was present at most groups and participating in programing.      06/16/20 1425   Behavioral Health   Hallucinations denies / not responding to hallucinations   Thinking poor concentration   Orientation person: oriented;place: oriented;date: oriented;time: oriented   Memory baseline memory   Insight poor   Judgement impaired   Eye Contact at examiner   Affect blunted, flat   Mood depressed   Physical Appearance/Attire appears stated age   Hygiene well groomed   Suicidality other (see comments)  (none stated none observed)   1. Wish to be Dead (Recent) No   2. Non-Specific Active Suicidal Thoughts (Recent) No   Self Injury other (see comment)  (none stated none observed)   Activity other (see comment)  (present in millieu)   Speech coherent;clear   Medication Sensitivity no observed side effects;no stated side effects   Psychomotor / Gait steady;balanced   Activities of Daily Living   Hygiene/Grooming independent   Oral Hygiene independent   Dress scrubs (behavioral health)   Laundry unable to complete   Room Organization independent

## 2020-06-16 NOTE — PROGRESS NOTES
"Patient asked if she could wear her flip flops. Writer brought patient her flip flops and patient started crying, sobbing \"you touched my stuff, you touched my flip flops! Did you touch anything else in my locker? I don't want anyone touching my stuff! I know this isn't rationale, that's why I'm here, you touched my flip flops!\" Writer spoke with patient about coping skills like deep breathing, \"I don't have any coping skills, that's why I'm here. You touched my flip flops!\" Patient was sobbing the whole time. Writer offered patient a Hydroxyzine.  Parents called patient at this time and patient talked to them and was able to stop crying while on the phone.    "

## 2020-06-16 NOTE — ED NOTES
Pt notified, per request of melvina on station 22, she will be in a locked unit and have no access to phones or computer and be in scrubs while on the unit. Attempted to call mother but no answer. Pt said mother on her way.

## 2020-06-16 NOTE — H&P
"History and Physical    Diana Altamirano MRN# [id number]   Age: 30 year old YOB: 1990     Date of Admission:  6/16/2020          Contacts:   Outpatient Psychiatrist: none. Recently had 1st appt on 6/11/2020 at [facility] in [facility], Eusebio Rojas NP, prescribed lorazepam, but Rx did not go through to pharmacy.  Primary Physician:  Josselin Diamond  Therapist: Therapist since 2017 - name unknown  Family Members: parents Karyn + breezy, [phone]  Partner [phone]         Chief Complaint:   OCD and anxiety         History of Present Illness:   History obtained from patient interview and chart review.      This patient is a 30 year old female with past history of OCD, anxiety, anorexia, depression, and PTSD treated outpatient with no prior hospitalizations , who presented with concern about her OCD and was admitted voluntarily from the [facility] ED.  She states that she has been using  gallons of bleach to clean her home .She thinks her symptoms began to worsen a few months ago, when the coronavirus started. She reports \"yelling\" at her  for touching things around the house. She says he was  supportive, for example agreeing to her requests to maintain cleanliness, but she worries he was also worsening her disease by agreeing to her requests. She reports poor appetite, racing throughts, compulsions to wash everything. She has been feeling grief since her  left their home- she believes he left due to not being able to live with her current OCD symptoms. She has not eaten much since he left, her parents report that she only ate 3 peanuts and has had a couple beers a day since last Monday (8 days ago). She has lost 13 lb over the last 3 months.     Patient reports OCD symptoms for many years. She reports working with a therapist since 2017.     She drinks a couple drinks at a time when drinking but endorses drinking more than 6 drinks on Saturday.She reports having a " "drink daily when she returned to work to cope with stress of work during COVID, and says this is unusual for her. She denies other drug use.  Patient expresses a desire to be able to leave her house for coffee, move from outdoors to indoors, and wants to invite others to her home without the compulsion to clean.       She reports starting sertraline 25mg daily for anxiety and depression, and it was increased to 50 about a year ago. She says she later realized her depression and anxiety symptoms were a result of her OCD. She reports she  discontinued sertralize last August 2019 because she was intending to get pregnant and wanted to be medication-free. She says when she started it in the past, ertraline at 25mg led to increased energy levels for ~3 weeks, this required her to run everyday to 'burn off the extra energy'. When titrating up to 50mg, side effects were tolerable. She is apprehensive about starting sertraline again  because of the increased energy side effect- reports feeling \"like she was on speed\" and worries how she will expend this energy in the hospital. She requests to start it at 12.5mg and slowly titrate up. We also discuss clomipramine, and patient prefers sertraline due to its \"known side effects, which I know I can tolerate, verus the unknown.\"       Collateral obtained (from parents):   Patient's parents want team to know that patient's partner left her without warning last week. They state \"we don't agree with how he did it, but no one could live under the situation at their house\" due to patient's severe symptoms.   They also note restricted eating. Since last Monday the 8th, she has eaten 2-3 peanuts total and has a couple beers per day. She is usually a regular runner. She does half marathons and races but has not been active over the last week. She used to eat everything and never had specific dietary dislikes. However, since March, she has started to limit certain food from her diet such " as meats and dairy. She expressed a strong desire to start intensive, even month long, therapy for her OCD to her parents last week. Parents have been living with her since  left to support her. They are wondering how best to support her now.     Per chart review:  Last seen in the ED 8 days ago with a mental health crisis with guidance from her therapist. Since COVID began, she has had worsening OCD symptoms. Anything with dirt or possible COVID contamination had to be bleached. She works in  at [employer]. She showed up in full rain gear on a hot day. She was previously  from her  but started living together again for the last year. Her  packed his things and left unexpectedly. She denied suicidal ideation but did not want to be alive at times.  Physical exam normal, appearance was normal and pleasant, but she was visibly nervous and anxious. She was given 5mg diazepam, rx for lorazepam .5mg (that she was unable to  since it was not sent to pharmacy), and was told to see her regular therapist.     The risks, benefits, alternatives and side effects have been discussed and are understood by the patient and other caregivers.         Psychiatric Review of Systems:   Depression: Yes, stemming from OCD per patient  Nan: No  Psychosis: No  Anxiety: Yes, stemming from OCD per patient   PTSD: no  OCD: Yes, see HPI  ADD/ADHD: No  Eating disorder: History of anorexia since childhood. It has been bad for the last week, see HPI. She says that it is unclear if it is a control mechanism.          Medical Review of Systems:   The Review of Systems is negative other than noted in the HPI         Psychiatric History:     Prior diagnoses:    OCD  Anxiety  Anorexia  Depression  PTSD    Prior treatment:  Regular, ongoing, outpatient care with a therapist.    Past medications:  Sertraline started at 25mg and titrated up to 50mg for depression and anxiety.      Hospitalizations:  None         Substance Use History (first use, avg use/week):    Alcohol: Increased use over last month, 1-2 beers per day, 6+ beers last Saturday.  Nicotine: none  Cannabis: none  Others: none    Prior CD treatments:   None         Past Medical History:     Past Medical History:   Diagnosis Date    Anorexia     Depressive disorder     ADDY (generalized anxiety disorder)     MDD (major depressive disorder)     OCD (obsessive compulsive disorder)     OCD (obsessive compulsive disorder)     OCD (obsessive compulsive disorder)     PTSD (post-traumatic stress disorder)      Past Surgical History:   Procedure Laterality Date    NO HISTORY OF SURGERY       No history of seizures or head trauma.       Allergies:    No Known Allergies       Medications:     No current outpatient medications on file.           Social History:     Upbringing: She grew up in the area with her family. Her parents are . She has an older bother and younger sister. Family dynamic is 'when we want to be heard, we get loud'. Her parents are always there to support her, and she expresses tension about being an independent adult vs. Accepting support of her parents.     Support system: Her parents, brother, sister, and two close friends are all supportive of her and want to help in any way they can without triggering any negative emotions or behavior. She usually finds a lot of support from her , who has helped calm and redirect her from particularly bad anxiety or depression spells in the past. But, her  has been gone for the last week and she wants to give him space. Reports brother may not be helpful to be involved in her care-- relates eating disorder to brother calling her fat.     Living Situation: She has her own home which she shares with her . She is suaually able to take care of essential and complex activities of daily living without assistance. Her parents have been living with her for the  last week since her  left and she has had increased OCD and anxiety symptoms.     Relationship/partner: She has a  that she  from in the past but they moved back in together last year. He recently left due to stress from the patient's OCD symptoms.    Education: Did not ask.    Occupation: Works as a  and  at [employer].    Legal history: Did not ask.    Abuse/Trauma: Did not ask.    : Did not ask.         Family History:   History of suicides: Did not ask.  Chemical dependency: Did not ask.    Brother has OCD and it is suspected that her grandmother had OCD or depression.       Family History   Problem Relation Age of Onset    Other - See Comments Sister         mental health    Prostate Cancer Paternal Grandfather     Neurologic Disorder Mother         PD    Neurologic Disorder Paternal Uncle         PD    Diabetes No family hx of     Myocardial Infarction No family hx of     Cerebrovascular Disease No family hx of     Coronary Artery Disease Early Onset No family hx of     Breast Cancer No family hx of     Ovarian Cancer No family hx of     Colon Cancer No family hx of             Labs:     Recent Results (from the past 24 hour(s))   CBC with platelets differential    Collection Time: 06/16/20  7:35 AM   Result Value Ref Range    WBC 6.4 4.0 - 11.0 10e9/L    RBC Count 4.57 3.8 - 5.2 10e12/L    Hemoglobin 13.4 11.7 - 15.7 g/dL    Hematocrit 39.1 35.0 - 47.0 %    MCV 86 78 - 100 fl    MCH 29.3 26.5 - 33.0 pg    MCHC 34.3 31.5 - 36.5 g/dL    RDW 12.3 10.0 - 15.0 %    Platelet Count 193 150 - 450 10e9/L    Diff Method Automated Method     % Neutrophils 39.4 %    % Lymphocytes 45.6 %    % Monocytes 11.8 %    % Eosinophils 2.5 %    % Basophils 0.5 %    % Immature Granulocytes 0.2 %    Nucleated RBCs 0 0 /100    Absolute Neutrophil 2.5 1.6 - 8.3 10e9/L    Absolute Lymphocytes 2.9 0.8 - 5.3 10e9/L    Absolute Monocytes 0.8 0.0 - 1.3 10e9/L    Absolute Eosinophils 0.2 0.0 - 0.7 10e9/L  "   Absolute Basophils 0.0 0.0 - 0.2 10e9/L    Abs Immature Granulocytes 0.0 0 - 0.4 10e9/L    Absolute Nucleated RBC 0.0    Comprehensive metabolic panel    Collection Time: 06/16/20  7:35 AM   Result Value Ref Range    Sodium 137 133 - 144 mmol/L    Potassium 3.2 (L) 3.4 - 5.3 mmol/L    Chloride 104 94 - 109 mmol/L    Carbon Dioxide 22 20 - 32 mmol/L    Anion Gap 11 3 - 14 mmol/L    Glucose 67 (L) 70 - 99 mg/dL    Urea Nitrogen 18 7 - 30 mg/dL    Creatinine 0.64 0.52 - 1.04 mg/dL    GFR Estimate >90 >60 mL/min/[1.73_m2]    GFR Estimate If Black >90 >60 mL/min/[1.73_m2]    Calcium 9.1 8.5 - 10.1 mg/dL    Bilirubin Total 1.1 0.2 - 1.3 mg/dL    Albumin 3.9 3.4 - 5.0 g/dL    Protein Total 7.6 6.8 - 8.8 g/dL    Alkaline Phosphatase 38 (L) 40 - 150 U/L    ALT 29 0 - 50 U/L    AST 33 0 - 45 U/L   TSH with free T4 reflex and/or T3 as indicated    Collection Time: 06/16/20  7:35 AM   Result Value Ref Range    TSH 0.75 0.40 - 4.00 mU/L   Lipid panel    Collection Time: 06/16/20  7:35 AM   Result Value Ref Range    Cholesterol 144 <200 mg/dL    Triglycerides 56 <150 mg/dL    HDL Cholesterol 55 >49 mg/dL    LDL Cholesterol Calculated 78 <100 mg/dL    Non HDL Cholesterol 89 <130 mg/dL   Bilirubin direct    Collection Time: 06/16/20  7:35 AM   Result Value Ref Range    Bilirubin Direct 0.3 (H) 0.0 - 0.2 mg/dL            Psychiatric Examination:     BP (!) 129/93   Pulse 80   Temp 98.3  F (36.8  C) (Oral)   Resp 18   Ht 1.66 m (5' 5.35\")   Wt 50.4 kg (111 lb 3.2 oz)   LMP 05/28/2020   SpO2 100%   BMI 18.30 kg/m      Appearance:  awake, alert, adequately groomed, dressed in hospital scrubs and thin  Behavior: consistent and appropriate with mood, sits on bed to Zoom  Attitude:  Cooperative, hesitant about receiving care and being in the hospital but expresses desire to control OCD.  Eye Contact:  good  Mood:  Anxious, sad, tearful  Affect:  Reactive, tearful, anxious/fearful, constricted range  Speech:  clear, " coherent  Psychomotor Behavior:  No evidence of tardive dyskinesia, tics, or dystonia.   Thought Process:  logical and goal oriented  Thought Content:  passive suicidal ideation present, no paranoia/AVH, HI.  Insight:  good  Judgment:  intact  Oriented to: person, place, time, situation.   Attention Span and Concentration:  intact  Recent and Remote Memory:  intact  Language:  English with appropriate syntax and vocabulary         Physical Exam:     See ED assessment note by Dr. Hurt on 6/15/2020.        Assessment     In summary, Diana is a 29 yo female with a past history of OCD, anorexia, anxiety, depression, and PTSD treated outpatient, no prior hospitalizations, last seen and discharged from the ED 8 days ago for mental health crisis. She presented to the ED again on 6/15/2020 with a desire to be admitted for treatment for her worsening OCD and anxiety. The patient currently has no outpatient psychiatrist.  Family history is notable for sibling with OCD. She describes passive suicidal ideation, grief, and anxiety about her OCD symptoms, her  leaving her, and symptoms interfering with her work and daily life. The patient's symptoms and exam are consistent with her current diagnosis of OCD, severe She reports increased alcohol use over the last month, about 2 drinks per day to cope with her stressors, with her last drink being yesterday afternoon.     Patient has also been limiting her food and fluid intake for the last few months, and particularly in the last week. It is not clear if she is restricting eating due to cleanliness fears related to OCD, of if this is a worsening of an underlying eating disorder. She has orthostatic hypotention, low potassium of 3.2, and tachycardia to 125. Phosphorus and magnesium normal. Considered refeeding syndrome, however per patient report she was eating adequately until just ~8 days ago. EKG showed Qtc of 454. Will encourage fluids and nutrient intake, continue  to monitor signs and symptoms.     Reason for inpatient hospitalization is to help her begin to control her OCD again. Disposition pending clinical stabilization, medication optimization, and formulation of safe discharge plan.    Principal psychiatric diagnosis:   1. OCD    Secondary psychiatric diagnoses:   2. Anorexia  3. Anxiety  4. Depression        Plan   Legal Status: Voluntary    Medications:   Outpatient medications held:    None.    Outpatient medications continued:   None.     New medications initiated:   Start sertraline at 12.5mg/day today    -  increase to 25mg/day over the next two days.   Consider switching to clomipramine in future  - Prn lorazepam 0.5mg q6h anxiety    - Patient will be treated in therapeutic milieu with appropriate individual and group therapies.    - symptom tracking  - family meetings with parents & partner     #orthostatic hypotention  #hypokalemia  #palpitations   HR jumped from 93 to 137, and systolic BP from 123 to 101 from sitting to standing, with symptomatic dizziness. Patient also complaining of palpitations today. Phos and mg within normal limits. EKG showed sinus rhythm, rate in the 70s, and QtC of 454.   - Monitor electrolytes  - Track oral intake   - Encourage fluids   - supplement potassium PRN   - nutrition consult per nursing routine, ordered Boost supplements       Safety:  Behavioral Orders   Procedures    Code 1 - Restrict to Unit    Routine Programming     As clinically indicated    Self Injury Precaution    Single Room    Status 15     Every 15 minutes.    Suicide precautions     Patients on Suicide Precautions should have a Combination Diet ordered that includes a Diet selection(s) AND a Behavioral Tray selection for Safe Tray - with utensils, or Safe Tray - NO utensils      Withdrawal precautions     Pt has not required locked seclusion or restraints in the past 24 hours to maintain safety, please refer to RN documentation for further  details.    Precautions: suicide, SIB     Disposition:  To be determined pending clinical stabilization, medication optimization, and appropriate disposition planning. IOP or day treatment likely, consider outpatient Eating Disorder Treatment     -------------------------------------------------------  This patient will be seen by attending provider in the morning.     Barry Stoll, MS3    I was present with the medical student who participated in the service and in the documentation of the note. I have verified the history and personally performed the physical exam and medical decision making. I agree with the assessment and plan of care as documented in the note. - Ange Adams    This patient was seen and discussed with the attending physician.    Ange Adams MD   PGY-1 Psychiatry        Attestation:

## 2020-06-16 NOTE — PHARMACY-ADMISSION MEDICATION HISTORY
Admission medication history interview status for the 6/16/2020 admission is complete. See Epic admission navigator for allergy information, pharmacy, prior to admission medications and immunization status.     Medication history interview sources:  Patient, [facility] fill history, [place]     Changes made to PTA medication list (reason)  Added: None  Deleted: None  Changed: None    Additional medication history information (including reliability of information, actions taken by pharmacist):    MP :  6/11: lorazepam 1mg tab #14 (7 DS)  6/8: lorazepam 0.5mg tab #14 (3 DS)    Prior to Admission medications    Medication Sig Last Dose Taking? Auth Provider   LORazepam (ATIVAN) 0.5 MG tablet Take 1 tablet (0.5 mg) by mouth every 6 hours as needed for anxiety   Forrest Burciaga MD     Medication history completed by:   Carmen Davis, Pharm D  June 16, 2020

## 2020-06-16 NOTE — ED NOTES
Per MD ok if parents bring her to [place]. Advised pt that MD said to go right there and make no stops. Pt concerned about insurance issues calling ins company.

## 2020-06-16 NOTE — ED NOTES
Parents here for pt. Advised them as well as what to do, go to er and let them know that he is going to be direct admit to station 22. Pt given paperwork for [facility].

## 2020-06-16 NOTE — PROGRESS NOTES
This morning when patient stood for standing BP became very lightheaded, felt like fainting. Had a big drop in orthostatic BP. Patient laid down in bed. Patient said has not had much to eat over the last week. Writer brought breakfast tray to room and ate a small amount of food. Fluids were encouraged. After an hour patient sat on edge of bed and then writer walked with patient in [place]. Patient denied lightheadedness. Writer brought patient to community meeting and patient said she was too anxious to attend and started crying. Writer walked patient back to room and brought patient a Hydroxyzine, markers for coloring with coloring pictures. After approximately 1 hour patient was in davis asking about groups. Writer brought patient to [facility] group and patient participated. Patient later said group was helpful and kept her distracted.

## 2020-06-16 NOTE — PHARMACY-ADMISSION MEDICATION HISTORY
Pharmacy Medication History  Admission medication history interview status for the 6/15/2020  admission is complete. See EPIC admission navigator for prior to admission medications     Medication history sources: Patient  Medication history source reliability: Good  Adherence assessment: discontinued zoloft on her own     Significant changes made to the medication list:  Removed zoloft 50mg daily      Additional medication history information:        Medication reconciliation completed by provider prior to medication history? No    Time spent in this activity: 10min      Prior to Admission medications    Medication Sig Last Dose Taking? Auth Provider   LORazepam (ATIVAN) 0.5 MG tablet Take 1 tablet (0.5 mg) by mouth every 6 hours as needed for anxiety 6/15/2020 Yes Forrest Burciaga MD

## 2020-06-16 NOTE — ED NOTES
Intake said since we have a bed available their job is done and it is up to the pt and the MD to find other arrangements for [facility] if desired.

## 2020-06-16 NOTE — PLAN OF CARE
BEHAVIORAL TEAM DISCUSSION    Participants: Dr. Arti Adams PGY-1  [facility]  Progress: Initiated with hospitalization  Anticipated length of stay: Pending medication adjustment 3-5days  Continued Stay Criteria/Rationale: Decompensation in functioning due to worsening OCD symptoms.   Medical/Physical: See H&P  Precautions:   Behavioral Orders   Procedures    Code 1 - Restrict to Unit    Routine Programming     As clinically indicated    Self Injury Precaution    Single Room    Status 15     Every 15 minutes.    Suicide precautions     Patients on Suicide Precautions should have a Combination Diet ordered that includes a Diet selection(s) AND a Behavioral Tray selection for Safe Tray - with utensils, or Safe Tray - NO utensils       Plan: Continue to monitor patient's symptoms as a medication regiment is initiated and document changes until proper discharge is set in place.   Rationale for change in precautions or plan: No change has been indicated.

## 2020-06-16 NOTE — ED NOTES
"Pt stated she wants to die but will not kill herself. \"I am not going to kill myself because that is a one way ticket to hell but if I  that would be ok\"    DEC updated  "

## 2020-06-16 NOTE — ED PROVIDER NOTES
History     Chief Complaint:  Psychiatric Evaluation       HPI   Diana Altamirano is a 30 year old female with a history of OCD and anxiety who presents for a psychiatric evaluation. Per chart review the patient was seen here in the ED on 6/8/20 for mental health concerns. The patient was seen by DEC, who recommended outpatient management. At that time the patient requested to be discharged and go home with her mother. Today, the patient presents to the ED for increased mental health concerns. She says that she has new stressors. She notes that her  moved out recently. She endorses the consumption of 1 beer today. She states that she wants inpatient treatment.     Allergies:  No Known Drug Allergies      Medications:    Ativan  Zoloft     Past Medical History:    Anorexia  Depressive disorder  ADDY  OCD  PTSD     Past Surgical History:    Surgical history reviewed. No pertinent surgical history.      Family History:    Mental health  Prostate cancer  Neurologic disorder     Social History:  Smoking Status: Never Smoker  Smokeless Tobacco: Never Used  Alcohol Use: Yes  Drug Use: No  PCP: Josselin Diamond     Review of Systems   Psychiatric/Behavioral: The patient is nervous/anxious.    All other systems reviewed and are negative.      Physical Exam     Patient Vitals for the past 24 hrs:   BP Temp Temp src Pulse Heart Rate Resp SpO2 Weight   06/15/20 1916 132/84 99  F (37.2  C) Temporal 125 125 20 98 % 50.7 kg (111 lb 12.8 oz)        Physical Exam  Vitals: reviewed by me  General: Pt seen on [facility], pleasant but distraught, tearful.  Eyes: Tracking well, clear conjunctiva BL  ENT: MMM, midline trachea.   Lungs: No tachypnea, no accessory muscle use. No respiratory distress.   CV: Rate as above, regular rhythm.    MSK: no peripheral edema or joint effusion.  No evidence of trauma  Skin: No rash, normal turgor and temperature  Neuro: Clear speech and no facial droop.  Psych: Not RIS, no e/o AH/VH,  denies suicidality, denies homicidality.        Impression & Plan      Medical Decision Making:  Diana Altamirano is a distraught 30 year old female who presents to the emergency department today for evaluation with a desire to be admitted for help with her OCD.  She appears to want to genuinely get better, ut she stats that she has been using gallons of bleach at home to clean. It seems as though her  has left her due to the difficulties aroused by her OCD in the pandemic, and she is accompanied by a note from her mother asking for inpatient care as well. patient states that she feels comfortable coming in to the hospital, she is unable to contract for safety and does appear to be distraught. Medically clear at this time, our Telehealth team has seen her and agrees that she should be placed in the next available mental health bed.    9:56 PM  I am told her [facility] bed is likely to become available, officially signed out to Dr. Baldwin, but with no intention of management this patient is meant to be transferred there via private vehicle.  I have spoken to his mother for about 15 minutes, and they feel comfortable taking her to the [facility] ER for direct admit.  Our nurses called nurse to nurse report as well, patient will be transferred to [facility] via private vehicle and admitted as above to Dr. Escalera.       Diagnosis:    ICD-10-CM    1. Obsessive-compulsive disorder, unspecified type  F42.9    2. Anxiety attack  F41.0      Disposition:   The patient will be transferred to [facility] via private vehicle.      Scribe Disclosure:  I, Jace Balderas, am serving as a scribe at 7:21 PM on 6/15/2020 to document services personally performed by Eusebio Hurt MD  based on my observations and the provider's statements to me.        Eusebio Hurt MD  06/15/20 0915

## 2020-06-16 NOTE — PROGRESS NOTES
"   06/16/20 1800   Music Therapy   Type of Participation Music therapy group   Response Participates independently   Treatment Detail .75   Participated in Music Therapy group on \"The Stages of Acceptance\".  A handout was given to patients on the 7 stages in the cycles of acceptance.  The song \"Let it Be\" by the Alfred was played and discussed afterwards.  Patients identified where they were on that cycle currently with difficulties in their lives.    Diana stated the song had special significance for her in her life, and that it was hard to hear because she fears her family will not support her anymore, which makes her very sad.  She stated she feels the \"anger\" stage is easier than all the rest, \"because at least you can do something.\"  She cited \" the depression\" stage as being very hard, \"because it is so internal.\"    ---After group check-in note:----    After group, MT went to check in with Diana, as she had left group early.  She spoke with MT about her fears and her OCD and how it has put a wedge between her and her 's relationship and how \"I hope he knows it's the OCD, it's not me and I can get better-we can work it out\".  MT listened empathically, affirming that it must be hard and complex to deal with.  MT recommended long-term therapy to address underlying issues, and Diana stated she does have a therapist \"which I'm so glad about\" she said.     Diana is not sure, at this point, if Music Therapy groups are helpful to her as \"it just brings up so much emotion about my family and I don't know if they're going to be there for me-I am trying everything, but I don't know if I made the right choice today.\"  She does express a desire to \"get better\" and MT affirmed her for having peserverance.  Does appear to have perseverative, fearful thinking patterns.    Her affect was open and tearful at times, but able to communicate well what she was thinking and feeling in the moment.  Recommend possible " individual work on thinking patterns, control, and self-acceptance.

## 2020-06-16 NOTE — PROGRESS NOTES
"Initially seen by OT on this date. Diana  attended 2 of 3 OT groups today. Tense and anxious. Described her mood as \"frightened.\" In OT clinic this a.m. she opened up about the fear that arose as her site of employment reopened & she was called back to work. Reported that she felt that her supervisor \"barked at her\" and held her mental illness (\"OCD\") against her. Cites a fear that she will be fired. Requested to work on a \"simple, directed project\" in OT clinic; spent the session working on a simple cut-out paper project. Later participated in a stretching and relaxation group; quiet and preoccupied. More observation needed to complete initial evaluation at this time.      06/16/20 6523   Occupational Therapy   Type of Intervention structured groups   Response Participates with encouragement   Hours 2       "

## 2020-06-16 NOTE — ED TRIAGE NOTES
States increased MH issues, has new stressors.  moved out. Seen here Monday. Tearful reports 1 beer today. Reports wants inpatient treatment

## 2020-06-17 PROCEDURE — H2032 ACTIVITY THERAPY, PER 15 MIN: HCPCS

## 2020-06-17 PROCEDURE — G0177 OPPS/PHP; TRAIN & EDUC SERV: HCPCS

## 2020-06-17 PROCEDURE — 12400001 ZZH R&B MH UMMC

## 2020-06-17 PROCEDURE — 99232 SBSQ HOSP IP/OBS MODERATE 35: CPT | Mod: GC | Performed by: PSYCHIATRY & NEUROLOGY

## 2020-06-17 PROCEDURE — 25000132 ZZH RX MED GY IP 250 OP 250 PS 637: Performed by: STUDENT IN AN ORGANIZED HEALTH CARE EDUCATION/TRAINING PROGRAM

## 2020-06-17 RX ORDER — CLOMIPRAMINE HYDROCHLORIDE 25 MG/1
25 CAPSULE ORAL AT BEDTIME
Status: DISCONTINUED | OUTPATIENT
Start: 2020-06-17 | End: 2020-06-19

## 2020-06-17 RX ORDER — LORAZEPAM 2 MG/ML
0.5 INJECTION INTRAMUSCULAR
Status: DISCONTINUED | OUTPATIENT
Start: 2020-06-17 | End: 2020-06-17

## 2020-06-17 RX ORDER — GABAPENTIN 100 MG/1
100 CAPSULE ORAL 2 TIMES DAILY PRN
Status: DISCONTINUED | OUTPATIENT
Start: 2020-06-17 | End: 2020-06-19 | Stop reason: HOSPADM

## 2020-06-17 RX ORDER — LORAZEPAM 0.5 MG/1
0.5 TABLET ORAL
Status: DISCONTINUED | OUTPATIENT
Start: 2020-06-17 | End: 2020-06-19 | Stop reason: HOSPADM

## 2020-06-17 RX ADMIN — GABAPENTIN 100 MG: 100 CAPSULE ORAL at 14:52

## 2020-06-17 RX ADMIN — HYDROXYZINE HYDROCHLORIDE 25 MG: 25 TABLET, FILM COATED ORAL at 10:22

## 2020-06-17 RX ADMIN — CLOMIPRAMINE HYDROCHLORIDE 25 MG: 25 CAPSULE ORAL at 21:25

## 2020-06-17 RX ADMIN — LORAZEPAM 0.5 MG: 0.5 TABLET ORAL at 21:26

## 2020-06-17 ASSESSMENT — ACTIVITIES OF DAILY LIVING (ADL)
DRESS: SCRUBS (BEHAVIORAL HEALTH);INDEPENDENT
ORAL_HYGIENE: INDEPENDENT
HYGIENE/GROOMING: INDEPENDENT
DRESS: SCRUBS (BEHAVIORAL HEALTH)
LAUNDRY: UNABLE TO COMPLETE
ORAL_HYGIENE: INDEPENDENT
HYGIENE/GROOMING: INDEPENDENT

## 2020-06-17 NOTE — PLAN OF CARE
Problem: OT General Care Plan  Goal: OT Goal 1    Pt attended 2 out of 2 OT groups offered. Pt actively participated in occupational therapy clinic. Pt was able to ask for assistance as needed, and independently initiated a multi-step, goal-directed task. Pt demonstrated good focus, planning, problem solving, and attention to detail. Very organized in her task approach. Intermittently social with peers and writer throughout, and shared that she has trouble petting dogs due to her fear of germs. She politely requested to listen to instrumental music without lyrics. Calm, pleasant, and cooperative throughout. Will continue to assess.

## 2020-06-17 NOTE — PROGRESS NOTES
Pt worked through significant waves of anxiety and intense emotions during dance/movement therapy (DMT.)  She was able to express what she needed and settle a bit when those needs were respected and met.  She used firm and strong physical movements to steel herself for waves of intensity, and identified some resource movements to allow her body to join in supporting her through.  Pt preferred nonverbal interaction because language triggers her obsessive thoughts.         06/17/20 1130   Dance Movement Therapy   Type of Intervention structured groups   Response participates with encouragement   Hours 1

## 2020-06-17 NOTE — PROGRESS NOTES
LEAH for [facility] signed.  Left a VM for [facility] admissions [phone] regarding referring pt to their OCD outpatient program.  Awaiting a call back.

## 2020-06-17 NOTE — PLAN OF CARE
Pt had a poor appetite at breakfast and only had 75% of the boost, 10% of the soy milk.  Didn't eat anything else from her tray.  Staff to continue to encourage pt to drink and increase her intake.

## 2020-06-17 NOTE — PROGRESS NOTES
Pt. Was visible in the milieu, social and engaged with others. She attended all of the groups today and was appropriate during. She acknowledges that she needs medication changes in order to help with her OCD. She ate one bite of her breakfast and drank about 75% of the boost. For lunch she ate salad and drank some boost, she was very slow eating this, it was over an hour's time. Currently, she denies SI/SIB; affect is full, mood congruent.        06/17/20 1414   Behavioral Health   Hallucinations denies / not responding to hallucinations   Thinking intact   Orientation person: oriented;place: oriented;date: oriented   Memory baseline memory   Insight admits / accepts   Judgement impaired   Eye Contact at examiner   Affect full range affect   Mood mood is calm   Physical Appearance/Attire attire appropriate to age and situation   Hygiene well groomed   Suicidality other (see comments)  (denies)   1. Wish to be Dead (Recent) No   2. Non-Specific Active Suicidal Thoughts (Recent) No   Self Injury other (see comment)  (denies)   Elopement   (No statements or behaviors indicating elopement risk.)   Activity other (see comment)  (Visible in the milieu, social with others, attended groups.)   Speech clear;coherent   Medication Sensitivity no stated side effects   Psychomotor / Gait balanced;steady   Coping/Psychosocial   Verbalized Emotional State acceptance;anxiety   Psycho Education   Type of Intervention 1:1 intervention   Response participates, initiates socially appropriate   Hours 0.5   Treatment Detail 1:1 check-in   Activities of Daily Living   Hygiene/Grooming independent   Oral Hygiene independent   Dress scrubs (behavioral health)   Laundry unable to complete   Room Organization independent

## 2020-06-17 NOTE — PROGRESS NOTES
Pt had an okay shift. Pt was visible and present in the milieu, watched TV, socialized with peers, colored and spoke on the phone. Pt presents as anxious with a blunted/flat affect but is calm and cooperative upon approach. Pt rated both her anxiety and depression as a 6/10. Pt stated she has had panic attacks everyday since April, and that anxiety is her baseline. Pt reported that she wants to go home to seek comfort and that she is very worried about spending tonight in the hospital. Pt denies SI/SIB and hallucinations. Pt given weighted blanket per request.        06/16/20 2200   Behavioral Health   Hallucinations denies / not responding to hallucinations   Thinking poor concentration   Orientation person: oriented;place: oriented;date: oriented;time: oriented   Memory baseline memory   Insight poor   Judgement impaired   Eye Contact at examiner;into space;at floor   Affect blunted, flat   Mood anxious;depressed   Physical Appearance/Attire attire appropriate to age and situation   Hygiene well groomed   Suicidality other (see comments)  (Denies)   1. Wish to be Dead (Recent) No   2. Non-Specific Active Suicidal Thoughts (Recent) No   Psycho Education   Type of Intervention 1:1 intervention   Response participates, initiates socially appropriate   Hours 0.5   Treatment Detail Check in   Activities of Daily Living   Hygiene/Grooming independent   Oral Hygiene independent   Dress independent   Laundry with supervision   Room Organization independent

## 2020-06-17 NOTE — PROGRESS NOTES
"    ----------------------------------------------------------------------------------------------------------  [facility], [place]   Psychiatric Progress Note  Hospital Day #3     Interim History:   The patient's care was discussed with the treatment team and chart notes were reviewed.    Sleep: 7.5 hours (06/17/20 0600)  Scheduled Medications: Taking as prescribed.   PRN medications: hydroxyzine x 2, lorazepam 10pm     Staff Report:   Pt was present and active in milieu last evening. She described her mood as frightened because of being in the hospital. She reported her anxiety and depression as a 6/10 last night. She was worried about sleeping in the hospital and wanted to go home. She participated in a simple directed project in the OT clinic and went to group yesterday.      She was upset when a staff member touched her shoes, and expressed that she felt this anger was \"irrational.\"    Orthostatics this morning were 116/79 and hr 135 sitting, 91/59 (error in chart) and hr 129 standing. She slept 7.5 hours last night. She started 12.5mg of sertraline yesterday.      Patient Interview:   Patient really wants to go home, but wants to make sure that she feels better, and everything is set up at home before she leaves. She says she'd like to \"stop crying\" before she leaves.  She wants to leave  soon\" and she doesn t feel like the treatment is \"specific enough\" in the hospital for OCD and doesn t address her day-to-day routine and symptoms. She has looked into [facility] for intensive outpatient OCD treatment. She would like us to look into that for her. She would also like someone she can talk to if things get severe and wants to feel that she has a secure support system when she leaves because she \"cant see herself doing this on her own.\"    She states that she \"can t stop moving\" from the sertraline, which she says is the same side effect she has had in the past. She " "feels the restlessness would be manageable if she could exercise. She does not have orthostatic hypotension, dizziness, or syncope, but does feel that her heart is racing.     She can t tell if the hydroxyzine helped for restlessness/sleep yesterday. She is still interested in increasing sertraline does says increasing dose  worked last time . We discussed switching to clomipramine, and educated her about the two medications.  She is nervous of the side effects of clomipramine, since she doesn't know how she will respond to the medication. After speaking to her mother, she felt good about trying clomipramine for the night and knowing that she would be able to switch back to sertraline if she chose. Ativan has been helpful to calm her down and put her to sleep which has been helpful because it has been difficult to sleep.    She would like to set up a meeting with her  regarding her treatment, we have not been able to get ahold of him. We will be having a group meeting with the pt and her parents tomorrow morning.     The risks, benefits, alternatives and side effects of any medication changes have been discussed and are understood by the patient and other caregivers.    Review of systems:     ROS was negative unless noted above.          Allergies:   No Known Allergies         Psychiatric Examination:   /79 (BP Location: Right arm)   Pulse 80   Temp 98  F (36.7  C)   Resp 14   Ht 1.66 m (5' 5.35\")   Wt 50.4 kg (111 lb 3.2 oz)   LMP 05/28/2020   SpO2 100%   BMI 18.30 kg/m    Weight is 111 lbs 3.2 oz  Body mass index is 18.3 kg/m .    Appearance:  awake, alert, adequately groomed, dressed in hospital scrubs and thin  Behavior: consistent and appropriate with mood, sits on bed to Zoom  Attitude:  Cooperative  Eye Contact:  good  Mood:  Anxious, sad, tearful  Affect:  Reactive, tearful, anxious/fearful, constricted range  Speech:  clear, coherent  Psychomotor Behavior:  No evidence of tardive " dyskinesia, tics, or dystonia.   Thought Process:  logical and goal oriented  Thought Content:  passive suicidal ideation present, no paranoia/AVH, HI.  Insight:  good  Judgment:  intact, hesitant about receiving care and being in the hospital but expresses desire to control OCD.  Oriented to: person, place, time, situation.   Attention Span and Concentration:  intact  Recent and Remote Memory:  intact  Language:  English with appropriate syntax and vocabulary  Muscle Strength and Tone: Not assessed.  Gait and Station: Not assessed. Pt reports that walking is fine without dizziness or unsteadiness.       Labs, Imaging, other studies:   EKG yesterday showed sinus rhythm at 70bpm with T wave inversion and QTc was 454, just above the upper limit of normal which is 450. No ST depression noted. This is consistent with her mild hypokalemia of 3.2. Magnesium and phosphate were normal. Pregnancy test was negative. Drug screen negative except for benzodiazepines which were given to her in the ED.       Assessment    Diagnostic Impression:   In summary, Diana is a 31 yo female who admitted herself voluntarily to seek help for worsening OCD symptoms and comorbid anorexia, anxiety, depression, and PTSD. Her conditions have not changed since admission. She is still very anxious, tearful, worried, and wants to return to the comfort of her home. We encouraged her to stay at least one more day to try clomipramine and setup adequate outpatient treatments before she leaves.     Principal psychiatric diagnosis:   1. OCD     Secondary psychiatric diagnoses:   2. Anorexia  3. Anxiety  4. Depression    Hospital course:   Day 1- Diana presented to the ED just before midnight with worsening OCD symptoms inhibiting her ability to maintain relationships and work. She also had a 8 day period of severe anorexia, eating only 3 peanuts total and drinking a couple beers daily. She wanted to be admitted to the hospital to help regain control of  her symptoms.     Day 2- Diana was admitted to station 22 for inpatient psychiatric care. Her symptoms were mostly unchanged except that she reported some palpatations. Labs showed mild hypokalemia of 3.2, normal magnesium, normal phosphate, normal TSH, and EKG showed prolonged QTc of 454ms with inverted T waves. She had one moment of an uncontrolled outburst about others touching her items and contaminating them. She had clear insight and logical decision making when we spoke to her. She participated in group sessions and therapy on the unit. She spoke with her parents who helped reassure her about treatment decisions. She was started on 12.5mg of sertraline today.    Day 3- Diana's symptoms were mostly unchanged except that she reported discomfort from restlessness and increased energy levels with starting sertraline. After a discussion with Diana and her mother, Diana agreed to try clomipramine 25mg today to see if she responds better to it.     Discontinued Medications & Rationale:  Sertraline discontinued because she experiences increased energy levels, causing discomfort in the hospital.    Medication changes this admission:   Discontinued all medications as of now, except those listed below.  Started clomipramine 25mg/day.  Started PRN gabapentin for anxiety and sleep.     Medical course:    Plan     Today's changes  Initiate clomipramine 25mg at bedtime  Stop sertraline 12.5   Start gabapentin 100mg BID PRN     #obsessive compulsive disorder  Clomipramine 25mg/day at bedtime  Gabapentin 100mg BID PRN  Lorazepam 0.5mg at bedtime prn     -Recheck BMP  -Repeat EKG if pt complains of cardiac symptoms     -Patient will be treated in therapeutic milieu with appropriate individual and group therapies as described.      Medical conditions:   #Orthostatic hypotension  #Tachycardia  - fluids  - ctm    #hypokalemia: 3.2, phos and mg normal.   - repeat EKG if cardiac symptoms   -repeat BMP    Legal Status:  Voluntary    Safety Assessment:   Behavioral Orders   Procedures     Code 1 - Restrict to Unit     Routine Programming     As clinically indicated     Self Injury Precaution     Single Room     Status 15     Every 15 minutes.     Suicide precautions     Patients on Suicide Precautions should have a Combination Diet ordered that includes a Diet selection(s) AND a Behavioral Tray selection for Safe Tray - with utensils, or Safe Tray - NO utensils         Disposition: Pending medical stabilization and medication optimization.    ------------------------------------------------------------------  Barry Stoll, MS3    I was present with the medical student who participated in the service and in the documentation of the note. I have verified the history and personally performed the physical exam and medical decision making. I agree with the assessment and plan of care as documented in the note. - Ange Adams    This patient was seen and discussed with the attending physician.    Ange Adams MD   PGY-1 Psychiatry

## 2020-06-18 LAB
ANION GAP SERPL CALCULATED.3IONS-SCNC: 5 MMOL/L (ref 3–14)
BUN SERPL-MCNC: 13 MG/DL (ref 7–30)
CALCIUM SERPL-MCNC: 9.2 MG/DL (ref 8.5–10.1)
CHLORIDE SERPL-SCNC: 105 MMOL/L (ref 94–109)
CO2 SERPL-SCNC: 29 MMOL/L (ref 20–32)
CREAT SERPL-MCNC: 0.65 MG/DL (ref 0.52–1.04)
GFR SERPL CREATININE-BSD FRML MDRD: >90 ML/MIN/{1.73_M2}
GLUCOSE SERPL-MCNC: 91 MG/DL (ref 70–99)
INTERPRETATION ECG - MUSE: NORMAL
MAGNESIUM SERPL-MCNC: 1.3 MG/DL (ref 1.6–2.3)
PHOSPHATE SERPL-MCNC: 3.2 MG/DL (ref 2.5–4.5)
POTASSIUM SERPL-SCNC: 3 MMOL/L (ref 3.4–5.3)
SODIUM SERPL-SCNC: 139 MMOL/L (ref 133–144)

## 2020-06-18 PROCEDURE — H2032 ACTIVITY THERAPY, PER 15 MIN: HCPCS

## 2020-06-18 PROCEDURE — 12400001 ZZH R&B MH UMMC

## 2020-06-18 PROCEDURE — 93005 ELECTROCARDIOGRAM TRACING: CPT

## 2020-06-18 PROCEDURE — 25000132 ZZH RX MED GY IP 250 OP 250 PS 637: Performed by: STUDENT IN AN ORGANIZED HEALTH CARE EDUCATION/TRAINING PROGRAM

## 2020-06-18 PROCEDURE — G0177 OPPS/PHP; TRAIN & EDUC SERV: HCPCS

## 2020-06-18 PROCEDURE — 99232 SBSQ HOSP IP/OBS MODERATE 35: CPT | Mod: GC | Performed by: PSYCHIATRY & NEUROLOGY

## 2020-06-18 PROCEDURE — 80048 BASIC METABOLIC PNL TOTAL CA: CPT | Performed by: STUDENT IN AN ORGANIZED HEALTH CARE EDUCATION/TRAINING PROGRAM

## 2020-06-18 PROCEDURE — 99232 SBSQ HOSP IP/OBS MODERATE 35: CPT | Performed by: PHYSICIAN ASSISTANT

## 2020-06-18 PROCEDURE — 93010 ELECTROCARDIOGRAM REPORT: CPT | Performed by: INTERNAL MEDICINE

## 2020-06-18 PROCEDURE — 25000132 ZZH RX MED GY IP 250 OP 250 PS 637: Performed by: PHYSICIAN ASSISTANT

## 2020-06-18 PROCEDURE — 36415 COLL VENOUS BLD VENIPUNCTURE: CPT | Performed by: STUDENT IN AN ORGANIZED HEALTH CARE EDUCATION/TRAINING PROGRAM

## 2020-06-18 PROCEDURE — 99207 ZZC CONSULT E&M CHANGED TO SUBSEQUENT LEVEL: CPT | Performed by: PHYSICIAN ASSISTANT

## 2020-06-18 PROCEDURE — 25000128 H RX IP 250 OP 636: Performed by: PHYSICIAN ASSISTANT

## 2020-06-18 PROCEDURE — 83735 ASSAY OF MAGNESIUM: CPT | Performed by: STUDENT IN AN ORGANIZED HEALTH CARE EDUCATION/TRAINING PROGRAM

## 2020-06-18 PROCEDURE — 84100 ASSAY OF PHOSPHORUS: CPT | Performed by: STUDENT IN AN ORGANIZED HEALTH CARE EDUCATION/TRAINING PROGRAM

## 2020-06-18 RX ORDER — POTASSIUM CHLORIDE 1.5 G/1.58G
40 POWDER, FOR SOLUTION ORAL ONCE
Status: COMPLETED | OUTPATIENT
Start: 2020-06-18 | End: 2020-06-18

## 2020-06-18 RX ORDER — MAGNESIUM SULFATE HEPTAHYDRATE 40 MG/ML
4 INJECTION, SOLUTION INTRAVENOUS ONCE
Status: COMPLETED | OUTPATIENT
Start: 2020-06-18 | End: 2020-06-18

## 2020-06-18 RX ORDER — POTASSIUM CHLORIDE 1.5 G/1.58G
20 POWDER, FOR SOLUTION ORAL ONCE
Status: COMPLETED | OUTPATIENT
Start: 2020-06-18 | End: 2020-06-18

## 2020-06-18 RX ADMIN — MAGNESIUM SULFATE IN WATER 4 G: 40 INJECTION, SOLUTION INTRAVENOUS at 14:15

## 2020-06-18 RX ADMIN — LORAZEPAM 0.5 MG: 0.5 TABLET ORAL at 22:20

## 2020-06-18 RX ADMIN — POTASSIUM CHLORIDE 40 MEQ: 1.5 POWDER, FOR SOLUTION ORAL at 13:00

## 2020-06-18 RX ADMIN — CLOMIPRAMINE HYDROCHLORIDE 25 MG: 25 CAPSULE ORAL at 22:20

## 2020-06-18 RX ADMIN — GABAPENTIN 100 MG: 100 CAPSULE ORAL at 05:42

## 2020-06-18 RX ADMIN — POTASSIUM CHLORIDE 20 MEQ: 1.5 POWDER, FOR SOLUTION ORAL at 17:23

## 2020-06-18 ASSESSMENT — MIFFLIN-ST. JEOR: SCORE: 1234.95

## 2020-06-18 ASSESSMENT — ACTIVITIES OF DAILY LIVING (ADL)
DRESS: INDEPENDENT;SCRUBS (BEHAVIORAL HEALTH)
LAUNDRY: WITH SUPERVISION
ORAL_HYGIENE: INDEPENDENT
HYGIENE/GROOMING: INDEPENDENT

## 2020-06-18 NOTE — PROGRESS NOTES
"Diana   attended 1 of 3 OT groups today. Tense and preoccupied. She came to OT clinic this afternoon requesting \"something to keep [her] distracted.\" Worked quietly on an [employer] project. Was given additional origami supplies to work with on the unit in her free time.      06/18/20 1600   Occupational Therapy   Type of Intervention structured groups   Response Participates with encouragement   Hours 1       "

## 2020-06-18 NOTE — PROGRESS NOTES
"    ----------------------------------------------------------------------------------------------------------  [facility], [place]   Psychiatric Progress Note  Hospital Day #4     Interim History:   The patient's care was discussed with the treatment team and chart notes were reviewed.    Sleep: 5.75 hours (06/18/20 0555)  Scheduled Medications: Taking as prescribed.   PRN medications: gabapentin 5am, 2pm yesterday     Staff Report:   Yesterday, Diana was eating a few bite of food and drank boost when it was given to her. For two meals it was not sent to her. She expresses interest in eating. She engaged in OT, music therapy, and group sessions. She still does want to be in the hospital but wants to get better.     Her orthostatic blood pressure improved. It is 100 bpm 118/85 sitting, and 112 bpm 105/76 standing. At 5am today she described a burning and tingling sensation in bilateral arms and chest that improved with gabapentin. She also felt dizzy and intermittently nauseous through the night and attributes it to clomipramine, which she took for the first time last night. She did not have any SOB.    The medicine team was consulted because of her symptoms, her Mg of 1.3, and K of 3.0. EKG showed T wave flattening. They were certain that her burning and tingling was due to low Mg and wanted to start her on IV Mg supplementation today.       Patient Interview:  When we discussed her lab results and the fact that she would need an IV placed for Mg supplementation, she began crying. She wants to leave 'today or tomorrow'. She states that \"I understand that I have to do this but I really don't want to\". After discussing with her about the fact that the Mg and her nutritional status were responsible for most of her symptoms from last night, she did not want to take the clomipramine again because she has \"a negative association with it now\". We encouraged her to take the clomipramine again " "tonight because it will help treat her OCD symptoms more effectively, but she is hesitant about it.     For now, we decided to focus on the magnesium IV and discuss other medication later. Nurse Annabella will be helping her with breathing exercises and ice packs to reduce anxiety about the IV placement.  Pt was informed that we do not feel medically that she is ready for discharge because of her electrolyte abnormalities, associated symptoms, and current nutritional status.    Family meetin:30-10am today   , the medical student, pt's parents, and the pt had a family meeting this AM to discuss inpatient and outpatient plans. Diana will be going to her parents house as a stepping stone to returning to her own home. She and her parents are worried about her brother, who is currently living with parents, because she and her brother fight a lot and he also has OCD. Parents are happy to have her home and want to support her.     The risks, benefits, alternatives and side effects of any medication changes have been discussed and are understood by the patient and other caregivers.    Review of systems:     ROS was negative unless noted above.          Allergies:   No Known Allergies         Psychiatric Examination:   /85 (BP Location: Left arm)   Pulse 100   Temp 98.3  F (36.8  C) (Temporal)   Resp 14   Ht 1.66 m (5' 5.35\")   Wt 51.8 kg (114 lb 4.8 oz)   LMP 2020   SpO2 100%   BMI 18.82 kg/m    Weight is 114 lbs 4.8 oz  Body mass index is 18.82 kg/m .    Appearance:  awake, alert, adequately groomed, dressed in hospital scrubs and thin  Behavior:  sits on bed to Zoom  Attitude:  Cooperative  Eye Contact:  good  Mood:  Anxious, sad, tearful  Affect:  Reactive, tearful, anxious/fearful, constricted range  Speech:  clear, coherent, sometimes speaking through tears   Psychomotor Behavior:  No evidence of tardive dyskinesia, tics, or dystonia.   Thought Process:  logical and goal oriented  Thought " Content:  passive suicidal ideation present, no paranoia/AVH, HI.  Insight:  good  Judgment:  intact, hesitant about receiving care and being in the hospital but expresses desire to control OCD.  Oriented to: person, place, time, situation.   Attention Span and Concentration:  intact  Recent and Remote Memory:  intact  Language:  English with appropriate syntax and vocabulary      Labs, Imaging, other studies:   K 3.0  Mg 1.3  EKG- T wave flattening       Sodium 06/18/2020 139      Potassium 06/18/2020 3.0*     Chloride 06/18/2020 105      Carbon Dioxide 06/18/2020 29      Anion Gap 06/18/2020 5      Glucose 06/18/2020 91      Urea Nitrogen 06/18/2020 13      Creatinine 06/18/2020 0.65      GFR Estimate 06/18/2020 >90      GFR Estimate If Black 06/18/2020 >90      Calcium 06/18/2020 9.2      Phosphorus 06/18/2020 3.2      Magnesium 06/18/2020 1.3*        Assessment    Diagnostic Impression:   In summary, Diana is a 29 yo female who admitted herself voluntarily to seek help for worsening OCD symptoms and comorbid anorexia, anxiety, depression, and PTSD. Her conditions have not changed since admission. She reported improvement in her OCD symptoms while taking sertraline 25-50mg in the last, and her symptoms leading to admission occurred in the context of stopping her medications last year. 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 Protective factors include good response to medication in the past, long term relationship with therapist, supportive parents, willingness to engage with treatment. Patient is also presenting with BMI <18 and restricted eating in context of a history of eating disorder. She and parents report that restricted eating worsened suddenly after  left, though she has been avoiding food groups for several months. Her vital signs and  electrolytes and concerning, and she requires electrolyte supplementation and close monitoring for refeeding syndrome. She is still very anxious, tearful, worried, and wants to return to the comfort of her home. We encouraged her to stay until her electrolyte abnormalities were corrected, try clomipramine again tonight, and setup adequate outpatient treatments before she leaves. She is hoping for discharge tomorrow.     Principal psychiatric diagnosis:   1. OCD     Secondary psychiatric diagnoses:   2. Anorexia  3. Anxiety  4. Depression     Hospital course:   Day 1- Diana presented to the ED just before midnight with worsening OCD symptoms inhibiting her ability to maintain relationships and work. She also had a 8 day period of severe anorexia, eating only 3 peanuts total and drinking a couple beers daily. She wanted to be admitted to the hospital to help regain control of her symptoms.      Day 2- Diana was admitted to station 22 for inpatient psychiatric care. Her symptoms were mostly unchanged except that she reported some palpatations. Labs showed mild hypokalemia of 3.2, normal magnesium, normal phosphate, normal TSH, and EKG showed prolonged QTc of 454ms with inverted T waves. She had one moment of an uncontrolled outburst about others touching her items and contaminating them. She had clear insight and logical decision making when we spoke to her. She participated in group sessions and therapy on the unit. She spoke with her parents who helped reassure her about treatment decisions. She was started on 12.5mg of sertraline today.     Day 3- Diana's symptoms were mostly unchanged except that she reported discomfort from restlessness and increased energy levels with starting sertraline. After a discussion with Diana and her mother, Diana agreed to try clomipramine 25mg today to see if she responds better to it.     Day 4- Diana described no change in OCD/anxiety symptoms, but did felt burning and tingling  in bilateral arms and chest this morning which resolved with gabapentin. Labs were significant for K lowered to 3.0, Mg lowered to 1.3, and EKG had T wave flattening. Burning and tingling were associated with low Mg. IV Mg replacement was started with direction from medicine consult. Diana also described nausea, which we associated with clomipramine. She is hesitant to take the medication again because of her experience this morning. Repeat EKG this morning showed no changes compared to previous.     Discontinued Medications & Rationale:  Sertraline discontinued because she experiences increased energy levels, causing discomfort in the hospital.     Medication changes this admission:   Discontinued all medications as of now, except those listed below.  Clomipramine 25mg/day.  PRN gabapentin for anxiety and sleep.      Medical course: Medicine consulted for electrolyte changes in context of restricted eating, borderline EKG changes, orthostatic hypotension, and chest tingling/pain. Electrolytes were supplemented with IV magensium and oral potassium. EKG monitoring was done. Patient ordered Boost shakes.     Plan     Today's changes:  Start 4g Mg IV  Start KCl supplement oral     #Obsessive compulsive disorder:  Clomipramine 25mg/day at bedtime  Gabapentin 100mg BID PRN  Lorazepam 0.5mg at bedtime prn      -Recheck BMP  -Repeat EKG if pt complains of cardiac symptoms     -Patient will be treated in therapeutic milieu with appropriate individual and group therapies as described.      Medical conditions:   #tachycardia, improving   -fluids  -ctm    #hypokalemia 3.0, Mg 1.3, Phos normal  - Internal medicine consult   - IV Mg supplement  -oral KCl supplement  - repeat EKG if cardiac symptoms   -repeat BMP    Legal Status: Voluntary    Safety Assessment:   Behavioral Orders   Procedures     Code 1 - Restrict to Unit     Routine Programming     As clinically indicated     Self Injury Precaution     Single Room     Status  15     Every 15 minutes.     Suicide precautions     Patients on Suicide Precautions should have a Combination Diet ordered that includes a Diet selection(s) AND a Behavioral Tray selection for Safe Tray - with utensils, or Safe Tray - NO utensils         Disposition: Pending medical stabilization and medication optimization.    ------------------------------------------------------------------  Barry Stoll, MS3    I was present with the medical student who participated in the service and in the documentation of the note. I have verified the history and personally performed the physical exam and medical decision making. I agree with the assessment and plan of care as documented in the note. - Ange Adams    This patient was seen and discussed with the attending physician.    Ange Adams MD   PGY-1 Psychiatry

## 2020-06-18 NOTE — PROGRESS NOTES
Pt was present but remained mostly socially withdrawn and isolative (with the exception of talking to staff). Pt was observed talking on the phone, watching TV, coloring, and eating dinner. Pt presented with a tense affect and frequently appeared tearful but remained calm and cooperative throughout the shift. During staff check-in pt stated she was having problems controlling her emotions and endorsed experiencing depression and anxiety. Pt made statements about wanting to leave but also showed some appropriate insight into her illness. Pt then denied experiencing SI, HI, and SIB.        06/17/20 2008   Behavioral Health   Thinking distractable;poor concentration   Orientation person: oriented;place: oriented;date: oriented;time: oriented   Memory   (appears baseline)   Insight admits / accepts   Judgement impaired   Eye Contact at examiner   Affect sad;tense   Mood depressed;anxious   Physical Appearance/Attire attire appropriate to age and situation   Hygiene   (adequate)   Suicidality   (pt denied thoughts and urges)   Self Injury   (pt denied thoughts and urges)   Elopement Statements about wanting to leave   Activity isolative;withdrawn   Speech clear;coherent   Medication Sensitivity no stated side effects;no observed side effects   Psychomotor / Gait balanced;steady   Psycho Education   Type of Intervention 1:1 intervention   Response participates, initiates socially appropriate   Hours 0.5   Treatment Detail   (check-in)   Activities of Daily Living   Hygiene/Grooming independent   Oral Hygiene independent   Dress scrubs (behavioral health);independent   Laundry   (pt did not do laundry)   Room Organization independent   Activity   Activity Assistance Provided independent
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
## 2020-06-18 NOTE — PLAN OF CARE
Problem: Adult Behavioral Health Plan of Care  Goal: Patient-Specific Goal (Individualization)  Flowsheets (Taken 6/18/2020 1039)  Patient Personal Strengths:   family/social support   independent living skills   insight into illness/situation  Patient Vulnerabilities: Relationship problems related t acuity of sx    Personal Plan of Care    Reasons you are in the Hospital  OCD  2. Depression  3. Anxiety  4.    Goals for Discharge    Feeling Stable  2.  Having a goal  3.  Proper program

## 2020-06-18 NOTE — CONSULTS
[facility]  Internal Medicine Consult     Diana Altamirano MRN# [id number]   Age: 30 year old YOB: 1990     Date of Admission: 6/16/2020  Date of Consult:  6/18/2020    Primary Care Provider: Josselin Diamond    Requesting Service: Psychiatry  Reason for Consult: electrolyte abnormalities in setting of restricted po intake     Assessment and Plan/Recommendations:   Diana Altamirano is a 31yo F with a hx of depression, OCD, PTSD, and anorexia who was admitted to behavioral health 6/16 for psych evaluation due to worsening depression and increased psychosocial stressors at home. Medicine consulted 6/18 for electrolyte abnormalities in setting of restricted po intake with assoc tingling and pain in chest/arms.     Anorexia nervosa  Restricted po intake  Hypomagnesemia  Hypokalemia  Pt with longstanding hx of anorexia nervosa, she reports she manages at home fairly well but when a traumatic life event happens, she starts restricting her diet. Over the past month she has increasingly restricted her diet. She feels she is getting enough fluid intake, denies orthostatic symptoms and she manages her electrolytes at home with drinking special teas. Last night she developed numbness/tingling to her chest, arms and hands. Her electrolytes this morning show hypomagnesemia to 1.3 and hypokalemia to 3.0 which are c/w her symptoms. She reports her numbness/tingling to chest, arms, and hands has resolved this morning. EKG without ischemic changes, slightly flattened T waves. Phos wnl.   - Will order 4g IV magnesium x 1  - Will give po 40meq KCl now, with po 20meq KCl this afternoon  - Repeat K and Mg tomorrow  - Strongly encouraged patient to increase po intake - she feels she will do better at home. Pending on labs tomorrow, will likely benefit from daily po K and po Mg on discharge with close PCP follow up   - Nutrition following virtually - continue to recommend their involvement    The above  "was discussed with primary team.     Tereza Vergara PA-C  Internal Medicine [facility] Hospitalist  [phone]  June 18, 2020    SUBJECTIVE   CC:   Electrolyte abnormalities, restricted po intake   HPI:   Diana Altamirano is a 31yo F with a hx of depression, OCD, PTSD, and anorexia who was admitted to behavioral health 6/16 for psych evaluation due to worsening depression and increased psychosocial stressors at home. Medicine consulted 6/18 for electrolyte abnormalities in setting of restricted po intake with assoc tingling and pain in chest/arms. Pt with longstanding hx of anorexia nervosa, she reports she manages at home fairly well but when a traumatic life event happens, she starts restricting her diet. Over the past month she has increasingly restricted her diet. She feels she is getting enough fluid intake, denies orthostatic symptoms and she manages her electrolytes at home with drinking special teas. Last night she developed numbness/tingling to her chest, arms and hands. She reports it resolved after she rested in the yoga \"mabel pose\". She denies any recurrence of these symptoms. We discuss the importance of having adequate magnesium and potassium levels in our body, and specifically for cardiac function and thus she agrees to have IV magnesium and oral potassium replacement today. She denies any other acute concerns at this time.      Past Medical History:     Past Medical History:   Diagnosis Date     Anorexia      Depressive disorder      ADDY (generalized anxiety disorder)      MDD (major depressive disorder)      OCD (obsessive compulsive disorder)      OCD (obsessive compulsive disorder)      OCD (obsessive compulsive disorder)      PTSD (post-traumatic stress disorder)          Past Surgical History:      Past Surgical History:   Procedure Laterality Date     NO HISTORY OF SURGERY           Social History:     Social History     Tobacco Use     Smoking status: Never Smoker     Smokeless " "tobacco: Never Used   Substance Use Topics     Alcohol use: Yes     Frequency: 2-4 times a month     Drinks per session: 1 or 2     Binge frequency: Never     Comment: occ         Family History:     Family History   Problem Relation Age of Onset     Other - See Comments Sister         mental health     Prostate Cancer Paternal Grandfather      Neurologic Disorder Mother         PD     Neurologic Disorder Paternal Uncle         PD     Diabetes No family hx of      Myocardial Infarction No family hx of      Cerebrovascular Disease No family hx of      Coronary Artery Disease Early Onset No family hx of      Breast Cancer No family hx of      Ovarian Cancer No family hx of      Colon Cancer No family hx of          Allergies:   No Known Allergies      Medications:   Reviewed. Please see MAR     Review of Systems:   10 point ROS of systems including Constitutional, Eyes, Respiratory, Cardiovascular, Gastroenterology, Genitourinary, Integumentary, Muscularskeletal, Psychiatric were all negative except for pertinent positives noted in my HPI.      OBJECTIVE   Physical Exam:   Vitals were reviewed  Blood pressure 118/85, pulse 100, temperature 98.3  F (36.8  C), temperature source Temporal, resp. rate 14, height 1.66 m (5' 5.35\"), weight 51.8 kg (114 lb 4.8 oz), last menstrual period 05/28/2020, SpO2 100 %, not currently breastfeeding.  General:alert, NAD, emaciated  HEENT: NCAT, EOMI, MMM  Cardiovascular: RRR  Lungs: effort normal on RA, lungs CTAB  Vascular: no peripheral edema, distal pulses palpable in UE  Neurologic: AAO X 3, no focal deficits, no tremors noted, strength 5/5 in upper extremities  Skin: no jaundice, rashes, or lesions noted on chest wall or UE        Data:        Lab Results   Component Value Date     06/18/2020    Lab Results   Component Value Date    CHLORIDE 105 06/18/2020    Lab Results   Component Value Date    BUN 13 06/18/2020      Lab Results   Component Value Date    POTASSIUM 3.0 " 06/18/2020    Lab Results   Component Value Date    CO2 29 06/18/2020    Lab Results   Component Value Date    CR 0.65 06/18/2020        Lab Results   Component Value Date    WBC 6.4 06/16/2020    HGB 13.4 06/16/2020    HCT 39.1 06/16/2020    MCV 86 06/16/2020     06/16/2020     Lab Results   Component Value Date    WBC 6.4 06/16/2020

## 2020-06-18 NOTE — PROGRESS NOTES
Pt came to the nurses station at 0450 c/o a burning/tingling sensation on bilateral arms and chest that started tonight, also c/o nausea that came suddenly and went away. Pt seems to think this is because of a new medication she started. Also stated she felt dizzy.  VS 97.4-72-/73. Denies SOB. Writer noted Pt was started on Clomipramine 25mg at HS. Prn Gabapentin administered for anxiety. Pt is concerned about this and will discuss with the team this morning. Checked on Pt a few times, is awake and reading laying on her bed at this time.

## 2020-06-18 NOTE — PROGRESS NOTES
Work Completed: Chart review.  Met with patient to complete initial psychosocial assessment and Personal Plan of care.  Did not participate in Team meeting as was providing coverage on another unit.  AVS started.  Made appointment for medication mgmt follow up.    Medication Management   Date/Time: Monday June 22nd at 1:00pm  Provider: Eusebio Rojas, MSN at [facility]  Address: [address] [place] [address]  Phone: [phone]  Fax: [phone]  The clinic will send you a link via text for you to be able to connect to the appointment via zoom.      Discharge plan or goal: 3-5 days.  Pt verbalized preference for Referral to OCD or other program for dispo.  No word yet on the referral.                  Barriers to discharge: Symptom stabilization and safe discharge plan

## 2020-06-19 VITALS
WEIGHT: 114.3 LBS | RESPIRATION RATE: 14 BRPM | BODY MASS INDEX: 19.04 KG/M2 | HEIGHT: 65 IN | TEMPERATURE: 98.5 F | SYSTOLIC BLOOD PRESSURE: 123 MMHG | DIASTOLIC BLOOD PRESSURE: 83 MMHG | HEART RATE: 98 BPM | OXYGEN SATURATION: 100 %

## 2020-06-19 LAB
INTERPRETATION ECG - MUSE: NORMAL
MAGNESIUM SERPL-MCNC: 1.9 MG/DL (ref 1.6–2.3)
POTASSIUM SERPL-SCNC: 3.5 MMOL/L (ref 3.4–5.3)

## 2020-06-19 PROCEDURE — 93010 ELECTROCARDIOGRAM REPORT: CPT | Performed by: INTERNAL MEDICINE

## 2020-06-19 PROCEDURE — 36415 COLL VENOUS BLD VENIPUNCTURE: CPT | Performed by: PHYSICIAN ASSISTANT

## 2020-06-19 PROCEDURE — 99239 HOSP IP/OBS DSCHRG MGMT >30: CPT | Mod: GC | Performed by: PSYCHIATRY & NEUROLOGY

## 2020-06-19 PROCEDURE — 84132 ASSAY OF SERUM POTASSIUM: CPT | Performed by: PHYSICIAN ASSISTANT

## 2020-06-19 PROCEDURE — 83735 ASSAY OF MAGNESIUM: CPT | Performed by: PHYSICIAN ASSISTANT

## 2020-06-19 RX ORDER — SERTRALINE HYDROCHLORIDE 25 MG/1
25 TABLET, FILM COATED ORAL DAILY
Qty: 30 TABLET | Refills: 0 | Status: SHIPPED | OUTPATIENT
Start: 2020-06-19 | End: 2020-07-06

## 2020-06-19 RX ORDER — GABAPENTIN 100 MG/1
200 CAPSULE ORAL 2 TIMES DAILY PRN
Qty: 120 CAPSULE | Refills: 0 | Status: SHIPPED | OUTPATIENT
Start: 2020-06-19 | End: 2020-06-19

## 2020-06-19 RX ORDER — GABAPENTIN 100 MG/1
200 CAPSULE ORAL 2 TIMES DAILY PRN
Qty: 120 CAPSULE | Refills: 0 | Status: SHIPPED | OUTPATIENT
Start: 2020-06-19 | End: 2020-11-24

## 2020-06-19 RX ORDER — LORAZEPAM 0.5 MG/1
0.5 TABLET ORAL
Qty: 3 TABLET | Refills: 0 | Status: CANCELLED | OUTPATIENT
Start: 2020-06-19

## 2020-06-19 RX ORDER — POTASSIUM CHLORIDE 1.5 G/1.58G
20 POWDER, FOR SOLUTION ORAL DAILY
Qty: 30 PACKET | Refills: 0 | Status: SHIPPED | OUTPATIENT
Start: 2020-06-19 | End: 2020-07-19

## 2020-06-19 RX ORDER — LORAZEPAM 0.5 MG/1
0.5 TABLET ORAL
Qty: 3 TABLET | Refills: 0 | Status: SHIPPED | OUTPATIENT
Start: 2020-06-19 | End: 2020-11-24

## 2020-06-19 RX ORDER — GABAPENTIN 100 MG/1
200 CAPSULE ORAL 2 TIMES DAILY PRN
Qty: 120 CAPSULE | Refills: 0 | Status: CANCELLED | OUTPATIENT
Start: 2020-06-19

## 2020-06-19 RX ORDER — CLOMIPRAMINE HYDROCHLORIDE 25 MG/1
25 CAPSULE ORAL AT BEDTIME
Qty: 7 CAPSULE | Refills: 0 | Status: SHIPPED | OUTPATIENT
Start: 2020-06-19 | End: 2020-06-19

## 2020-06-19 NOTE — PROGRESS NOTES
"    ----------------------------------------------------------------------------------------------------------  [facility], [place]   Psychiatric Progress Note  Hospital Day #0     Interim History:   The patient's care was discussed with the treatment team and chart notes were reviewed.    Sleep: 6 hours (06/19/20 0600)  Scheduled Medications: Taking as prescribed.   PRN medications:     Staff Report:       Patient Interview:     Frustrated about using the clomipramine and thought we were switching to sertraline ? took it again and was up half the night with heartburn and nausea ? no more clomipramine  Didn t sleep much last night ? couldn t fall asleep ? got about 4 hours of sleep  Wants to go home today, frustrated because she hasn t heard from her   No lightheaded dizziness walking around  Throwing up last night  No burning or tingling in her chests or arms  [place] a warm firey feeling in her chest after taking the clomipramine  Had 3 boost shakes yesterday   Every minute in the hospital is draining   Her anxiety has gotten worse since being in the hospital and she hasn t had any OCD triggers so that hasn t been addressed  No benefit from the gabapentin ? going to take the higher dose of gabapentin and a few days supply of Ativan to take if the gabapentin doesn t work    The risks, benefits, alternatives and side effects of any medication changes have been discussed and are understood by the patient and other caregivers.    Review of systems:     ROS was negative unless noted above.          Allergies:   No Known Allergies         Psychiatric Examination:   /83 (BP Location: Left arm)   Pulse 98   Temp 98.5  F (36.9  C) (Temporal)   Resp 14   Ht 1.66 m (5' 5.35\")   Wt 51.8 kg (114 lb 4.8 oz)   LMP 05/28/2020   SpO2 100%   BMI 18.82 kg/m    Weight is 114 lbs 4.8 oz  Body mass index is 18.82 kg/m .    Appearance:  Behavior:   Attitude:    Eye Contact:    Mood:  "   Affect:    Speech:   Language: Fluent in English, no word-finding difficulty, no dysarthria, no neologisms.   Psychomotor Behavior:  No evidence of tardive dyskinesia, dystonia, akathisia, or abnormal movments.   Thought Process:  { :367320}  Thought Content:  { :927897}  Insight:  { :500577}  Judgment:  { :999472}  Oriented to:  Person, time, place, situation.   Attention Span and Concentration:  intact to interview.   Recent and Remote Memory:  intact per interview.   Muscle Strength and Tone: normal strength and no rigidity or increased tone.   Gait and Station: normal      Labs, Imaging, other studies:   { :235530}       Assessment    Diagnostic Impression:     Principal Diagnosis:   # ***    Secondary psychiatric diagnoses of concern this admission:   # ***    Hospital course:     Discontinued Medications & Rationale:    Medication changes this admission:     Medical course:    Plan     Today's changes:      -Psychotropic medications:      -Patient will be treated in therapeutic milieu with appropriate individual and group therapies as described.      Medical conditions:       Legal Status: {PSYCHLEGAL:245362}    Safety Assessment:   Behavioral Orders   Procedures     Code 1 - Restrict to Unit     Routine Programming     As clinically indicated     Self Injury Precaution     Single Room     Status 15     Every 15 minutes.     Suicide precautions     Patients on Suicide Precautions should have a Combination Diet ordered that includes a Diet selection(s) AND a Behavioral Tray selection for Safe Tray - with utensils, or Safe Tray - NO utensils         Disposition: Pending medical stabilization and medication optimization.    ------------------------------------------------------------------

## 2020-06-19 NOTE — PROGRESS NOTES
Patient discharged to home. Transportation provided by her parents. Discussed discharge instructions with patient. Affect is full range and mood is anxious. All valuables returned to patient. Discharge meds were called to her local pharmacy. [facility] and [facility]. Annabella Del Rio RN

## 2020-06-19 NOTE — DISCHARGE INSTRUCTIONS
v Behavioral Discharge Planning and Instructions      Summary:  You were admitted on 6/16/2020  due to Anxiety.  You were treated by Dr. Arti Hwang MD and discharged on 6/19/20 from [facility]      Principal Diagnosis:   1. OCD    Health Care Follow-up Appointments:   Medication Management   Date/Time: Monday June 22nd at 1:00pm  Provider: Eusebio Rojas, MSN at [facility]  Address: [address] [address]  Phone: [phone]  Fax: [phone]  The clinic will send you a link via text for you to be able to connect to the appointment via zoom.    You have been referred to the OCD Program at [facility] [address]  [address],  [place] [address]  They will call you to schedule a telephone interview.  If you would like you may call them at [phone]    Therapy  Date and time: Your therapist schedules her own appointments.  Please reach out to her to schedule a follow up appointment that can take place within 48 hours of discharge  Provider: Mela Jackson   Attend all scheduled appointments with your outpatient providers. Call at least 24 hours in advance if you need to reschedule an appointment to ensure continued access to your outpatient providers.   Major Treatments, Procedures and Findings:  You were provided with: a psychiatric assessment, assessed for medical stability, medication evaluation and/or management, group therapy and milieu management    Symptoms to Report: feeling more aggressive, increased confusion, losing more sleep, mood getting worse or thoughts of suicide    Early warning signs can include: increased depression or anxiety sleep disturbances increased thoughts or behaviors of suicide or self-harm  increased unusual thinking, such as paranoia or hearing voices    Safety and Wellness:  Take all medicines as directed.  Make no changes unless your doctor suggests them.      Follow treatment recommendations.  Refrain from alcohol  and non-prescribed drugs.  Ask your support system to help you reduce your access to items that could harm yourself or others. If there is a concern for safety, call 911.    Resources:   [employer] [place] [employer] Mental [employer]ness (www.[place].ermias.org): 331.217.6498 or 730-124-2281.  Alcoholics Anonymous (www.alcoholics-anonymous.org): Check your phone book for your local chapter.  Mental Health Association of [place] (www.mentalhealth.org): 629.662.5761 or 919-431-5033  Self- Management and Recovery Training., [employer]-- Toll free: 362.870.4121  www.[employer]  [facility] Crisis (COPE) Response - Adult [phone]    Lifestyle Adjustment:   1. Adjust your lifestyle to get enough sleep, relaxation, exercise and good nutrition.  Continue to develop healthy coping skills to decrease stress and promote a healthy  lifestyle.  2. Abstain from all substances of abuse.  3. Take medications as prescribed.  Please work with your doctor to discuss any concerns you have with your medications or side effects you may be experiencing.  4. Follow up with appointments as scheduled.      General Medication Instructions:   1. See your medication sheet(s) for instructions.   2. Take all medicines as directed.  Make no changes unless your doctor suggests them.   3. Go to all your doctor visits.  4. Be sure to have all your required lab tests. This way, your medicines can be refilled on time.  5. Do not use any drugs not prescribed by your doctor.    The treatment team has appreciated the opportunity to work with you.     Diana,  please take care and make your recovery a daily recovery. If you would like to obtain any specific documentation regarding your hospitalization after your discharge, contact [facility] of Information/Medical Records:  [phone]

## 2020-06-19 NOTE — PROGRESS NOTES
"   06/18/20 2100   General Information   Art Directive other (see comments)   AT directive is to create an image of self as a landscape. Goals of directive: to create a personal self narrative, emotional expression, trauma containment, identifying personal strengths and goals.  Pt was an active quiet participant, focused on task for the majority of group. Pt expressed current feelings in artwork, sharing that the flowing water represents her moods, depression and that she left a blank space to represent her goal or what she feels is missing in her life. When asked what she would like to see in this blank space, pt answered \"peace.\"  Pts mood was calm.  "

## 2020-06-19 NOTE — PROGRESS NOTES
Pt presents labile early in shift, crying freq. Active particip in [employer].  Endorsed experiencing depression and anxiety. [place] in later evening, social in the milieu. Watched tv and colored. Ate very little, salad, soy milk, a few tablespoons of rice and beans.     06/18/20 1800   Behavioral Health   Hallucinations denies / not responding to hallucinations   Thinking distractable   Orientation person: oriented;place: oriented;date: oriented;time: oriented   Memory baseline memory   Insight admits / accepts   Judgement impaired   Eye Contact at examiner   Affect full range affect   Mood labile;depressed   Physical Appearance/Attire attire appropriate to age and situation;untidy   Hygiene other (see comment)  (adequate)   Activity other (see comment)  (Active particip in group. Social at times.)   Speech clear;coherent   Psychomotor / Gait balanced;steady   Activities of Daily Living   Hygiene/Grooming independent   Oral Hygiene independent   Dress independent;scrubs (behavioral health)   Laundry with supervision   Room Organization independent

## 2020-06-19 NOTE — DISCHARGE SUMMARY
"    Psychiatric Discharge Summary    Hospital Day #0    Diana Altamirano MRN# [id number]   Age: 30 year old YOB: 1990     Date of Admission:  6/16/2020  Date of Discharge:  6/19/2020  1:34 PM  Admitting Physician:  Arti Hwang MD  Discharge Physician:  Arti Hwang MD         Event Leading to Hospitalization:     This patient is a 30 year old female with past history of OCD, anxiety, anorexia, depression, and PTSD treated outpatient with no prior hospitalizations , who presented with concern about her OCD and was admitted voluntarily from the [facility] ED.  She states that she has been using  gallons of bleach to clean her home .She thinks her symptoms began to worsen a few months ago, when the coronavirus started. She reports \"yelling\" at her  for touching things around the house. She says he was  supportive, for example agreeing to her requests to maintain cleanliness, but she worries he was also worsening her disease by agreeing to her requests. She reports poor appetite, racing throughts, compulsions to wash everything. She has been feeling grief since her  left their home- she believes he left due to not being able to live with her current OCD symptoms. She has not eaten much since he left, her parents report that she only ate 3 peanuts and has had a couple beers a day since last Monday (8 days ago). She has lost 13 lb over the last 3 months.      Patient reports OCD symptoms for many years. She reports working with a therapist since 2017.      She drinks a couple drinks at a time when drinking but endorses drinking more than 6 drinks on Saturday.She reports having a drink daily when she returned to work to cope with stress of work during COVID, and says this is unusual for her. She denies other drug use.  Patient expresses a desire to be able to leave her house for coffee, move from outdoors to indoors, and wants to invite others to her home " "without the compulsion to clean.      She reports starting sertraline 25mg daily for anxiety and depression, and it was increased to 50 about a year ago. She says she later realized her depression and anxiety symptoms were a result of her OCD. She reports she  discontinued sertralize last August 2019 because she was intending to get pregnant and wanted to be medication-free. She says when she started it in the past, ertraline at 25mg led to increased energy levels for ~3 weeks, this required her to run everyday to 'burn off the extra energy'. When titrating up to 50mg, side effects were tolerable. She is apprehensive about starting sertraline again  because of the increased energy side effect- reports feeling \"like she was on speed\" and worries how she will expend this energy in the hospital. She requests to start it at 12.5mg and slowly titrate up. We also discuss clomipramine, and patient prefers sertraline due to its \"known side effects, which I know I can tolerate, verus the unknown.\"         Collateral obtained (from parents):   Patient's parents want team to know that patient's partner left her without warning last week. They state \"we don't agree with how he did it, but no one could live under the situation at their house\" due to patient's severe symptoms.   They also note restricted eating. Since last Monday the 8th, she has eaten 2-3 peanuts total and has a couple beers per day. She is usually a regular runner. She does half marathons and races but has not been active over the last week. She used to eat everything and never had specific dietary dislikes. However, since March, she has started to limit certain food from her diet such as meats and dairy. She expressed a strong desire to start intensive, even month long, therapy for her OCD to her parents last week. Parents have been living with her since  left to support her. They are wondering how best to support her now.      Per chart review:  Last " seen in the ED 8 days ago with a mental health crisis with guidance from her therapist. Since COVID began, she has had worsening OCD symptoms. Anything with dirt or possible COVID contamination had to be bleached. She works in  at [employer]. She showed up in full rain gear on a hot day. She was previously  from her  but started living together again for the last year. Her  packed his things and left unexpectedly. She denied suicidal ideation but did not want to be alive at times.  Physical exam normal, appearance was normal and pleasant, but she was visibly nervous and anxious. She was given 5mg diazepam, rx for lorazepam .5mg (that she was unable to  since it was not sent to pharmacy), and was told to see her regular therapist.           Diagnoses:   Obsessive Compulsive Disorder  Panic Disorder  Anorexia nervosa          Consults:     Internal Medicine for electrolyte imbalance with symptoms (see below)         Hospital Course:     Diagnostic Impression:   In summary, Diana is a 31 yo female who admitted herself voluntarily to seek help for worsening OCD symptoms and comorbid anorexia, anxiety, depression, and PTSD. Her conditions have not changed since admission. She reported improvement in her OCD symptoms while taking sertraline 25-50mg in the last, and her symptoms leading to admission occurred in the context of stopping her medications last year. 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 Protective factors include good response to medication in the past, long term relationship with therapist, supportive parents, willingness to engage with treatment.     Patient is also presenting with BMI <18 and restricted eating in context of a history of eating disorder. She and parents report that restricted eating worsened  suddenly after  left, though she has been avoiding food groups for several months. Her vital signs and electrolytes and concerning, and she required electrolyte supplementation and close monitoring for refeeding syndrome.      Principal psychiatric diagnosis:   1. OCD     Secondary psychiatric diagnoses:   2. Anorexia nervosa   3. Anxiety  4. Depression     Hospital course:   Diana presented to the ED just before midnight with worsening OCD symptoms inhibiting her ability to maintain relationships and work. She also had a 8 day period of severe anorexia, eating only 3 peanuts total and drinking a couple beers daily. She wanted to be admitted to the hospital to help regain control of her symptoms. She was admitted to station 22 for inpatient psychiatric care. Her symptoms were mostly unchanged except that she reported some palpatations. Labs showed mild hypokalemia of 3.2, normal magnesium, normal phosphate, normal TSH, and EKG showed prolonged QTc of 454ms with inverted T waves. She had one moment of an uncontrolled outburst about others touching her items and contaminating them. She had clear insight and logical decision making when we spoke to her. She participated in group sessions and therapy on the unit. She spoke with her parents who helped reassure her about treatment decisions. She started 12.5mg of sertraline with a sudden increase in discomfort from restlessness and increased energy levels. After a discussion with Diana and her mother, Diana agreed to try clomipramine 25mg to see if she responded better to it. She remained anxious, tearful, worried, and wanted to return to the comfort of her home. We encouraged her to stay until her electrolyte abnormalities were corrected, try clomipramine again, and setup adequate outpatient treatments before leaving. Diana described no change in OCD/anxiety symptoms, but did felt burning and tingling in bilateral arms and chest which resolved with gabapentin.  "Labs were significant for K lowered to 3.0, Mg lowered to 1.3, and EKG had T wave flattening. Burning and tingling were associated with low Mg. IV Mg replacement was started with direction from medicine consult. Diana also described nausea, which we associated with clomipramine. She was hesitant to take the medication again because of her experience this morning. Repeat EKG showed no changes compared to previous.  Diana insisted on discharge on hospital day 5, against medical advice. She described heartburn and nausea after her second dose of clomipramine 25mg. Was frustrated because she thought she was switching back to Zoloft the day prior. She did not sleep much. Anxiety has been worse since being in the hospital and OCD triggers have been fewer in the hospital so she feels she isn't addressing her disease. She was not sure if she was getting benefit from gabapentin 100mg. Willing to try 200mg BID PRN and only use Ativan if gabapentin does not help. Pt was advised that Ativan should be short-term medication to help with acute crisis and will not be a long term solution for anxiety. She agreed to continue medications with close follow-up. She discharged with no meaningful reduction in symptoms compared to prior to admission.    Discontinued Medications & Rationale:  Clomipramine tried at 25mg for 2 days - patient said she felt \"a warmth in her chest\" and was not open to continuing this medication.      Medication changes this admission:   Discontinued all medications as of now, except those listed below.  Clomipramine 25mg/day- tried for 2 days, patient preferred to go back to Zoloft.  Started PRN gabapentin  for anxiety and sleep.      Prescriptions at discharge:  -Sertraline 12.5 with plan to increase to 25mg after 3 days-- target dose at least 100mg  -Lorazepam 0.5mg daily PRN (3 tablets dispensed) until her f/u psychiatry appt  -Gabapentin 200mg BID PRN for anxiety     - Magnesium supplement and potassium " supplementation: take until follow up appt with primary care.     **we recommend clomipramine in the future as a medication if patient does not have adequate response to Zoloft. Patient was very anxious about starting a new medication during this stay. The side effects she had were not discernable given low Mg status and this should not preclude her from trying this medication in the future.**        Medical course: Medicine consulted for electrolyte changes in context of restricted eating, borderline EKG changes, orthostatic hypotension, and chest tingling/pain. Electrolytes were supplemented with IV magensium and oral potassium. EKG monitoring was done. QTC was 454-455, with otherwise reassuring EKG. Patient ordered Boost shakes and was assessed by Nutritionist. Diana restricted intake while in the hospital. She ate  less than 15% of food trays and had some Boost shakes.      Follow up:  - repeat labs (phos, mg, CMP) in 1 week  - consider outpatient eating disorder treatment in future     Risk Assessment:  Diana Alatmirano has notable risk factors for self-harm, including anxiety disorder, debilitating OCD, depressive symptoms. However, risk is mitigated by no suicidal ideation, support from family, follow up in intensive outpatient treatment, close follow up.Additional steps taken to minimize risk include: safety planning with patient and her family. Therefore, based on all available evidence including the factors cited above, Diana Altamirano does not appear to be at imminent risk for self-harm, and is appropriate for outpatient level of care.     This document serves as a transfer of care to Diana Altamirano's outpatient providers.         Discharge Medications:     Discharge Medication List as of 6/19/2020 12:44 PM        START taking these medications    Details   magnesium oxide (MAG-OX) 400 (241.3 Mg) MG tablet Take 0.5 tablets (200 mg) by mouth daily, Disp-15 tablet,R-0, E-Prescribe      potassium chloride  (KLOR-CON) 20 MEQ packet Take 20 mEq by mouth daily, Disp-30 packet,R-0, E-Prescribe      sertraline (ZOLOFT) 25 MG tablet Take 1 tablet (25 mg) by mouth daily, Disp-30 tablet,R-0, E-PrescribeTake 12.5mg (half-tablet) for 3 days, then increase to 25mg.           CONTINUE these medications which have CHANGED    Details   gabapentin (NEURONTIN) 100 MG capsule Take 2 capsules (200 mg) by mouth 2 times daily as needed for other (anxiety), Disp-120 capsule,R-0, E-Prescribe      LORazepam (ATIVAN) 0.5 MG tablet Take 1 tablet (0.5 mg) by mouth nightly as needed for anxiety or sleep, Disp-3 tablet,R-0, Local Print           STOP taking these medications       clomiPRAMINE (ANAFRANIL) 25 MG capsule Comments:   Reason for Stopping:                      Psychiatric Examination:   Appearance:  awake, alert, adequately groomed, dressed in hospital scrubs and thin  Behavior:  sits on bed to Zoom  Attitude:  Cooperative  Eye Contact:  good  Mood:  Anxious, sad, tearful  Affect:  Reactive, tearful, angry/frustrated, anxious/fearful, constricted range  Speech:  clear, coherent, sometimes speaking through tears   Psychomotor Behavior:  No evidence of tardive dyskinesia, tics, or dystonia.   Thought Process:  logical and goal oriented  Thought Content:  passive suicidal ideation present, no paranoia/AVH, HI.  Insight:  good  Judgment:  intact, hesitant about receiving care and being in the hospital but expresses desire to control OCD.  Oriented to: person, place, time, situation.   Attention Span and Concentration:  intact  Recent and Remote Memory:  intact  Language:  English with appropriate syntax and vocabulary         Discharge Plan:   Diana was discharged to her parents' home.     Health Care Follow-up Appointments:   Medication Management   Date/Time: Monday June 22nd at 1:00pm  Provider: Eusebio Rojas, MSN at [facility]  Address: [address] [place] [address] [place] [address]  Phone: [phone]  Fax: [phone]  The  clinic will send you a link via text for you to be able to connect to the appointment via zoom.    Follow up Labs and Primary Care   - Mg, Phos, CMP ordered to follow up on electrolyte disturbances. Patient should have these done as an outpatient in 1 week and see her Primary Care Doctor.    You have been referred to the OCD Program at [facility] [address],  [place] [address]  They will call you to schedule a telephone interview.  If you would like you may call them at [phone]    Therapy  Date and time: Your therapist schedules her own appointments.  Please reach out to her to schedule a follow up appointment that can take place within 48 hours of discharge  Provider: Mela Jackson     Patient was seen and discussed with attending physician.   Ange Adams MD  PGY-1 Psychiatry             Appendix A: All Labs This Admission:     Results for orders placed or performed during the hospital encounter of 06/16/20   HCG qualitative urine     Status: None   Result Value Ref Range    HCG Qual Urine Negative NEG^Negative   Drug abuse screen 8 urine     Status: Abnormal   Result Value Ref Range    Amphetamine Qual Urine Negative NEG^Negative    Barbiturates Qual Urine Negative NEG^Negative    Benzodiazepine Qual Urine Positive (A) NEG^Negative    Cannabinoids Qual Urine Negative NEG^Negative    Cocaine Qual Urine Negative NEG^Negative    Ethanol Qual Urine Negative NEG^Negative    Opiates Qualitative Urine Negative NEG^Negative    PCP Qual Urine Negative NEG^Negative   CBC with platelets differential     Status: None   Result Value Ref Range    WBC 6.4 4.0 - 11.0 10e9/L    RBC Count 4.57 3.8 - 5.2 10e12/L    Hemoglobin 13.4 11.7 - 15.7 g/dL    Hematocrit 39.1 35.0 - 47.0 %    MCV 86 78 - 100 fl    MCH 29.3 26.5 - 33.0 pg    MCHC 34.3 31.5 - 36.5 g/dL    RDW 12.3 10.0 - 15.0 %    Platelet Count 193 150 - 450 10e9/L    Diff Method Automated Method     % Neutrophils 39.4  %    % Lymphocytes 45.6 %    % Monocytes 11.8 %    % Eosinophils 2.5 %    % Basophils 0.5 %    % Immature Granulocytes 0.2 %    Nucleated RBCs 0 0 /100    Absolute Neutrophil 2.5 1.6 - 8.3 10e9/L    Absolute Lymphocytes 2.9 0.8 - 5.3 10e9/L    Absolute Monocytes 0.8 0.0 - 1.3 10e9/L    Absolute Eosinophils 0.2 0.0 - 0.7 10e9/L    Absolute Basophils 0.0 0.0 - 0.2 10e9/L    Abs Immature Granulocytes 0.0 0 - 0.4 10e9/L    Absolute Nucleated RBC 0.0    Comprehensive metabolic panel     Status: Abnormal   Result Value Ref Range    Sodium 137 133 - 144 mmol/L    Potassium 3.2 (L) 3.4 - 5.3 mmol/L    Chloride 104 94 - 109 mmol/L    Carbon Dioxide 22 20 - 32 mmol/L    Anion Gap 11 3 - 14 mmol/L    Glucose 67 (L) 70 - 99 mg/dL    Urea Nitrogen 18 7 - 30 mg/dL    Creatinine 0.64 0.52 - 1.04 mg/dL    GFR Estimate >90 >60 mL/min/[1.73_m2]    GFR Estimate If Black >90 >60 mL/min/[1.73_m2]    Calcium 9.1 8.5 - 10.1 mg/dL    Bilirubin Total 1.1 0.2 - 1.3 mg/dL    Albumin 3.9 3.4 - 5.0 g/dL    Protein Total 7.6 6.8 - 8.8 g/dL    Alkaline Phosphatase 38 (L) 40 - 150 U/L    ALT 29 0 - 50 U/L    AST 33 0 - 45 U/L   TSH with free T4 reflex and/or T3 as indicated     Status: None   Result Value Ref Range    TSH 0.75 0.40 - 4.00 mU/L   Lipid panel     Status: None   Result Value Ref Range    Cholesterol 144 <200 mg/dL    Triglycerides 56 <150 mg/dL    HDL Cholesterol 55 >49 mg/dL    LDL Cholesterol Calculated 78 <100 mg/dL    Non HDL Cholesterol 89 <130 mg/dL   Bilirubin direct     Status: Abnormal   Result Value Ref Range    Bilirubin Direct 0.3 (H) 0.0 - 0.2 mg/dL   Magnesium     Status: None   Result Value Ref Range    Magnesium 1.7 1.6 - 2.3 mg/dL   Phosphorus     Status: None   Result Value Ref Range    Phosphorus 3.6 2.5 - 4.5 mg/dL   Basic metabolic panel     Status: Abnormal   Result Value Ref Range    Sodium 139 133 - 144 mmol/L    Potassium 3.0 (L) 3.4 - 5.3 mmol/L    Chloride 105 94 - 109 mmol/L    Carbon Dioxide 29 20 - 32  mmol/L    Anion Gap 5 3 - 14 mmol/L    Glucose 91 70 - 99 mg/dL    Urea Nitrogen 13 7 - 30 mg/dL    Creatinine 0.65 0.52 - 1.04 mg/dL    GFR Estimate >90 >60 mL/min/[1.73_m2]    GFR Estimate If Black >90 >60 mL/min/[1.73_m2]    Calcium 9.2 8.5 - 10.1 mg/dL   Phosphorus     Status: None   Result Value Ref Range    Phosphorus 3.2 2.5 - 4.5 mg/dL   Magnesium     Status: Abnormal   Result Value Ref Range    Magnesium 1.3 (L) 1.6 - 2.3 mg/dL   Magnesium     Status: None   Result Value Ref Range    Magnesium 1.9 1.6 - 2.3 mg/dL   Potassium     Status: None   Result Value Ref Range    Potassium 3.5 3.4 - 5.3 mmol/L   EKG 12-lead, complete     Status: None   Result Value Ref Range    Interpretation ECG Click View Image link to view waveform and result    EKG 12 lead     Status: None   Result Value Ref Range    Interpretation ECG Click View Image link to view waveform and result    Internal Medicine Adult IP Consult for [facility] in [facility]: Patient to be seen: Routine within 24 hrs; Call back #: [phone]; low potassium, low mg, restricted eating, tingling and pain in chest/arms; Consultant may enter orders: Yes; ...     Status: None ()    Tereza Miles PA-C     6/18/2020 11:36 AM    [facility]  Internal Medicine Consult     Diana Altamirano MRN# [id number]   Age: 30 year old YOB: 1990     Date of Admission: 6/16/2020  Date of Consult:  6/18/2020    Primary Care Provider: Josselin Diamond    Requesting Service: Psychiatry  Reason for Consult: electrolyte abnormalities in setting of   restricted po intake     Assessment and Plan/Recommendations:   Diana Altamirano is a 31yo F with a hx of depression, OCD, PTSD,   and anorexia who was admitted to behavioral health 6/16 for psych   evaluation due to worsening depression and increased psychosocial   stressors at home. Medicine consulted 6/18 for electrolyte   abnormalities in setting of restricted po intake  with assoc   tingling and pain in chest/arms.     Anorexia nervosa  Restricted po intake  Hypomagnesemia  Hypokalemia  Pt with longstanding hx of anorexia nervosa, she reports she   manages at home fairly well but when a traumatic life event   happens, she starts restricting her diet. Over the past month she   has increasingly restricted her diet. She feels she is getting   enough fluid intake, denies orthostatic symptoms and she manages   her electrolytes at home with drinking special teas. Last night   she developed numbness/tingling to her chest, arms and hands. Her   electrolytes this morning show hypomagnesemia to 1.3 and   hypokalemia to 3.0 which are c/w her symptoms. She reports her   numbness/tingling to chest, arms, and hands has resolved this   morning. EKG without ischemic changes, slightly flattened T   waves. Phos wnl.   - Will order 4g IV magnesium x 1  - Will give po 40meq KCl now, with po 20meq KCl this afternoon  - Repeat K and Mg tomorrow  - Strongly encouraged patient to increase po intake - she feels   she will do better at home. Pending on labs tomorrow, will likely   benefit from daily po K and po Mg on discharge with close PCP   follow up   - Nutrition following virtually - continue to recommend their   involvement    The above was discussed with primary team.     Tereza Vergara PA-C  Internal Medicine [facility]  [phone]  June 18, 2020    SUBJECTIVE   CC:   Electrolyte abnormalities, restricted po intake   HPI:   Diana Altamirano is a 31yo F with a hx of depression, OCD, PTSD,   and anorexia who was admitted to behavioral health 6/16 for psych   evaluation due to worsening depression and increased psychosocial   stressors at home. Medicine consulted 6/18 for electrolyte   abnormalities in setting of restricted po intake with assoc   tingling and pain in chest/arms. Pt with longstanding hx of   anorexia nervosa, she reports she manages at home fairly well but   when a  "traumatic life event happens, she starts restricting her   diet. Over the past month she has increasingly restricted her   diet. She feels she is getting enough fluid intake, denies   orthostatic symptoms and she manages her electrolytes at home   with drinking special teas. Last night she developed   numbness/tingling to her chest, arms and hands. She reports it   resolved after she rested in the yoga \"mabel pose\". She denies   any recurrence of these symptoms. We discuss the importance of   having adequate magnesium and potassium levels in our body, and   specifically for cardiac function and thus she agrees to have IV   magnesium and oral potassium replacement today. She denies any   other acute concerns at this time.      Past Medical History:     Past Medical History:   Diagnosis Date    Anorexia     Depressive disorder     ADDY (generalized anxiety disorder)     MDD (major depressive disorder)     OCD (obsessive compulsive disorder)     OCD (obsessive compulsive disorder)     OCD (obsessive compulsive disorder)     PTSD (post-traumatic stress disorder)          Past Surgical History:      Past Surgical History:   Procedure Laterality Date    NO HISTORY OF SURGERY           Social History:     Social History     Tobacco Use    Smoking status: Never Smoker    Smokeless tobacco: Never Used   Substance Use Topics    Alcohol use: Yes     Frequency: 2-4 times a month     Drinks per session: 1 or 2     Binge frequency: Never     Comment: occ         Family History:     Family History   Problem Relation Age of Onset    Other - See Comments Sister         mental health    Prostate Cancer Paternal Grandfather     Neurologic Disorder Mother         PD    Neurologic Disorder Paternal Uncle         PD    Diabetes No family hx of     Myocardial Infarction No family hx of     Cerebrovascular Disease No family hx of     Coronary Artery Disease Early Onset No family hx of     Breast Cancer No family hx of     Ovarian Cancer No " "family hx of     Colon Cancer No family hx of          Allergies:   No Known Allergies      Medications:   Reviewed. Please see MAR     Review of Systems:   10 point ROS of systems including Constitutional, Eyes,   Respiratory, Cardiovascular, Gastroenterology, Genitourinary,   Integumentary, Muscularskeletal, Psychiatric were all negative   except for pertinent positives noted in my HPI.      OBJECTIVE   Physical Exam:   Vitals were reviewed  Blood pressure 118/85, pulse 100, temperature 98.3  F (36.8  C),   temperature source Temporal, resp. rate 14, height 1.66 m (5'   5.35\"), weight 51.8 kg (114 lb 4.8 oz), last menstrual period   05/28/2020, SpO2 100 %, not currently breastfeeding.  General:alert, NAD, emaciated  HEENT: NCAT, EOMI, MMM  Cardiovascular: RRR  Lungs: effort normal on RA, lungs CTAB  Vascular: no peripheral edema, distal pulses palpable in UE  Neurologic: AAO X 3, no focal deficits, no tremors noted,   strength 5/5 in upper extremities  Skin: no jaundice, rashes, or lesions noted on chest wall or UE        Data:        Lab Results   Component Value Date     06/18/2020    Lab Results   Component Value Date    CHLORIDE 105 06/18/2020    Lab Results   Component Value Date    BUN 13 06/18/2020      Lab Results   Component Value Date    POTASSIUM 3.0 06/18/2020    Lab Results   Component Value Date    CO2 29 06/18/2020    Lab Results   Component Value Date    CR 0.65 06/18/2020        Lab Results   Component Value Date    WBC 6.4 06/16/2020    HGB 13.4 06/16/2020    HCT 39.1 06/16/2020    MCV 86 06/16/2020     06/16/2020     Lab Results   Component Value Date    WBC 6.4 06/16/2020                "

## 2020-06-22 ENCOUNTER — TELEPHONE (OUTPATIENT)
Dept: INTERNAL MEDICINE | Facility: CLINIC | Age: 30
End: 2020-06-22

## 2020-06-22 ENCOUNTER — PATIENT OUTREACH (OUTPATIENT)
Dept: CARE COORDINATION | Facility: CLINIC | Age: 30
End: 2020-06-22

## 2020-06-22 DIAGNOSIS — Z71.89 COUNSELING AND COORDINATION OF CARE: Primary | ICD-10-CM

## 2020-06-22 NOTE — PROGRESS NOTES
Clinic Care Coordination Contact  [facility]/Voicemail       Clinical Data: Care Coordinator Outreach  Outreach attempted x 1.  Left message on patient's voicemail with call back information and requested return call.  Plan: Care Coordinator will try to reach patient again in 1-2 business days.    MAGDY Bennett  Clinic Care Coordination  [facility] Clinics: [facility], and [facility]  Phone: [phone]

## 2020-06-23 NOTE — PROGRESS NOTES
Clinic Care Coordination Contact  Community Health Worker Initial Outreach    CHW Initial Information Gathering:  Referral Source: IP Report  Preferred Hospital: [facility]  [phone]  Current living arrangement:: (Currently living with Parents)  Community Resources: None  Supplies used at home:: (Magnesium Tablets)  Equipment Currently Used at Home: none  Informal Support system:: Parent  No PCP office visit in Past Year: No  Transportation means:: Regular car    Patient accepts CC: Yes     Phone Assessment is scheduled on 6/25/20 at 9:00am with MARCOS Thompson RN.    Layla Quintanilla, PRANEETHW  Clinic Care Coordination  [facility] Clinics: [facility], [facility], and [facility]  Phone: [phone]

## 2020-06-24 ENCOUNTER — TELEPHONE (OUTPATIENT)
Dept: INTERNAL MEDICINE | Facility: CLINIC | Age: 30
End: 2020-06-24

## 2020-06-24 NOTE — TELEPHONE ENCOUNTER
Short-term Disability--[facility]    Reason for Call:  Form, our goal is to have forms completed with 72 hours, however, some forms may require a visit or additional information.    Type of letter, form or note:  disability    Who is the form from?: Insurance comp    Where did the form come from: form was faxed in    What clinic location was the form placed at?: Internal Medicine    Where the form was placed: Dr. Diamond's Box/Folder    What number is listed as a contact on the form?: [phone]       Additional comments: fax to [phone]    Call taken on 6/24/2020 at 9:33 AM by Yessica Saldana

## 2020-06-25 ENCOUNTER — PATIENT OUTREACH (OUTPATIENT)
Dept: NURSING | Facility: CLINIC | Age: 30
End: 2020-06-25
Payer: COMMERCIAL

## 2020-06-25 ASSESSMENT — ACTIVITIES OF DAILY LIVING (ADL): DEPENDENT_IADLS:: INDEPENDENT

## 2020-06-25 NOTE — TELEPHONE ENCOUNTER
Virtual visit (preferably video) works for me. In person would be next best. Can conduct visit over lunch if needed.

## 2020-06-25 NOTE — TELEPHONE ENCOUNTER
Dr. Diamond, please advise if F2F visit need or virtual visit appropriate for hospital f/u per hospital discharge recc.    Alize Spears CMA

## 2020-06-25 NOTE — LETTER
[facility] CARE COORDINATION  [facility]  [address] , [place] [address]    June 25, 2020    Diana Altamirano  [address]  [facility] [address]      Dear Diana,    I am a clinic care coordinator who works with Josselin Diamond MD at [facility]. I wanted to thank you for taking the time to speak with me.  Below is a description of clinic care coordination and how I can further assist you.      The clinic care coordination team is made up of a registered nurse,  and community health worker who understand the health care system. The goal of clinic care coordination is to help you manage your health and improve access to the health care system in the most efficient manner. The team can assist you in meeting your health care goals by providing education, coordinating services, strengthening the communication among your providers and supporting you with any resource needs.    Please feel free to contact me with any questions or concerns. We are focused on providing you with the highest-quality healthcare experience possible and that all starts with you.     Sincerely,     Antonio Escalona RN  Clinic Care Coordinator  [facility]  Ph: [phone]    Enclosed: I have enclosed a copy of the Complex Care Plan. This has helpful information and goals that we have talked about. Please keep this in an easy to access place to use as needed.

## 2020-06-25 NOTE — LETTER
[facility]  Complex Care Plan  About Me:    Patient Name:  Diana Altamirano    YOB: 1990  Age:         30 year old   Jonathan MRN:    [id number] Telephone Information:  Home Phone [phone]   Mobile [phone]       Address:  [address] [facility] [address] Email address:  [email or web address]      Emergency Contact(s)    Name Relationship Lgl Grd Work Phone Home Phone Mobile Phone   1. OFFICER MILKA* Mother   [phone]    2. KODY BAUTISTA Spouse   [phone]           Primary language:  English     needed? No   [place]:  [phone] op. 1  Other communication barriers: None  Preferred Method of Communication:     Current living arrangement: I live in a private home  Mobility Status/ Medical Equipment: Independent    Health Maintenance  Health Maintenance Reviewed: Due/Overdue   Health Maintenance Due   Topic Date Due     DEPRESSION ACTION PLAN  1990     HIV SCREENING  02/11/2003     PHQ-9  12/27/2019     PREVENTIVE CARE VISIT  06/27/2020     My Access Plan  Medical Emergency 911   Primary Clinic Line [facility] - [phone]   24 Hour Appointment Line [phone] or  [phone]) (toll-free)   24 Hour Nurse Line [phone] (toll-free)   Preferred Urgent Care [facility], [phone]   Preferred Hospital [facility]  [phone]   Preferred Pharmacy [id number] [place] [id number] [facility]     Behavioral Health Crisis Line The National Suicide Prevention Lifeline at 1-129.238.2386 or 911       My Care Team Members  Patient Care Team       Relationship Specialty Notifications Start End    Josselin Diamond MD PCP - General Internal Medicine  6/27/19     Phone: [phone] Fax: [phone]         [address] [facility] [address]    Josselin Diamond MD Assigned PCP   7/7/19     Phone: [phone] Fax:  [phone]         [address] [facility] [address]    Eusebio Rojas    6/25/20     ph: [phone]    Phone: [phone] Fax: [phone]         [facility] [address] [facility] [address]    Yanelis Herrera    6/25/20     ph: [phone]    Phone: [phone] Fax: [phone]         XXX RESIGNED XXX [address] [facility] [address]    Antonio Escalona, RN Lead Care Coordinator Primary Care - CC  6/25/20     Phone: [phone]                 My Care Plans  Self Management and Treatment Plan  Goals and (Comments)  Goals        General    1. Mental Health Management (pt-stated)     Notes - Note edited  6/25/2020  9:51 AM by Antonio Escalona, RN    Goal Statement: I want to improve my overall mental health.  Date Goal set: 6/25/20  Barriers: personal and work stress related to COVID-19, multiple contributing diagnoses (anxiety, depression)  Strengths: established relationship with therapist, willingness to participate in treatment, supportive parents  Date to Achieve By: 12/31/20  Patient expressed understanding of goal: Yes  Action steps to achieve this goal:  1. I will participate in a program for treatment of my OCD.  2. I will continue regularly following up with therapy/psychiatry for my OCD, anxiety, and depression.  3. I will take my medications daily as prescribed and will notify my care team of any side effects or concerns with my medications prior to stopping/changing them.  4. I will explore additional resources for mental health support.  5. I will continue working with Care Coordination to address any barriers to achieving my goal.             My Medical and Care Information  Problem List   Patient Active Problem List   Diagnosis     ADDY (generalized anxiety disorder)     MDD (major depressive disorder)     OCD (obsessive compulsive disorder)     Anorexia     PTSD (post-traumatic stress disorder)      Current Medications:  Current Outpatient Medications   Medication      gabapentin (NEURONTIN) 100 MG capsule     LORazepam (ATIVAN) 0.5 MG tablet     magnesium oxide (MAG-OX) 400 (241.3 Mg) MG tablet     potassium chloride (KLOR-CON) 20 MEQ packet     sertraline (ZOLOFT) 25 MG tablet     No current facility-administered medications for this visit.      Care Coordination Start Date: 6/25/2020   Frequency of Care Coordination: 2 weeks   Form Last Updated: 06/25/2020

## 2020-07-06 ENCOUNTER — VIRTUAL VISIT (OUTPATIENT)
Dept: INTERNAL MEDICINE | Facility: CLINIC | Age: 30
End: 2020-07-06
Payer: COMMERCIAL

## 2020-07-06 DIAGNOSIS — F33.1 MODERATE EPISODE OF RECURRENT MAJOR DEPRESSIVE DISORDER (H): ICD-10-CM

## 2020-07-06 DIAGNOSIS — F42.9 OBSESSIVE-COMPULSIVE DISORDER, UNSPECIFIED TYPE: ICD-10-CM

## 2020-07-06 DIAGNOSIS — F50.019 ANOREXIA NERVOSA, RESTRICTING TYPE: ICD-10-CM

## 2020-07-06 DIAGNOSIS — F41.1 GAD (GENERALIZED ANXIETY DISORDER): ICD-10-CM

## 2020-07-06 DIAGNOSIS — F43.10 PTSD (POST-TRAUMATIC STRESS DISORDER): ICD-10-CM

## 2020-07-06 DIAGNOSIS — Z09 HOSPITAL DISCHARGE FOLLOW-UP: Primary | ICD-10-CM

## 2020-07-06 PROCEDURE — 99214 OFFICE O/P EST MOD 30 MIN: CPT | Mod: 95 | Performed by: INTERNAL MEDICINE

## 2020-07-06 RX ORDER — SERTRALINE HYDROCHLORIDE 25 MG/1
25 TABLET, FILM COATED ORAL DAILY
Qty: 30 TABLET | Refills: 0 | Status: CANCELLED | OUTPATIENT
Start: 2020-07-06

## 2020-07-06 NOTE — LETTER
07/06/20      Diana Altamirano  1990  [address] [facility] [address]        To whom it may concern,    Please excuse Ms. Altamirano from work starting June 9th, 2020 through September 7th, 2020. She will be needing time to rest and recuperate from an illness that I am seeing her for. She may return to work on September 8th, 2020 without restrictions.    Please contact me with any question or concerns.        Josselin Diamond MD   [facility]  [address] [place] [address]  T: [phone], F: [phone]

## 2020-07-06 NOTE — PROGRESS NOTES
"Diana Altamirano is a 30 year old female who is being evaluated via a billable video visit.      The patient has been notified of following:     \"This video visit will be conducted via a call between you and your physician/provider. We have found that certain health care needs can be provided without the need for an in-person physical exam.  This service lets us provide the care you need with a video conversation.  If a prescription is necessary we can send it directly to your pharmacy.  If lab work is needed we can place an order for that and you can then stop by our lab to have the test done at a later time.    Video visits are billed at different rates depending on your insurance coverage.  Please reach out to your insurance provider with any questions.    If during the course of the call the physician/provider feels a video visit is not appropriate, you will not be charged for this service.\"    Patient has given verbal consent for Video visit? Yes    How would you like to obtain your AVS? MyChart     Will anyone else be joining your video visit? No     VIDEO VISIT                                                      SUBJECTIVE:                                                      HPI: Diana Altamirano is a pleasant 30 year old female who requested a video visit to discuss her recent hospitalization:    Patient was voluntarily admitted to [facility] psychiatric unit 6/16/2020-6/19/2020 for debilitating OCD, panic disorder, and anorexia nervosa. She also suffers from ADDY, PTSD, and MDD. She was restarted on Zoloft for mood and gabapentin as needed for anxiety. She was also started on electrolyte replacements for metabolic abnormalities resulting from her restrictive eating patterns. She was eventually discharged on hospital day #5 with no meaningful reduction in her symptoms compared to prior to admission.    Patient continues to struggle with debilitating OCD and panic disorder at home (staying with parents). She reports " being barely able to function and struggling to get by currently. She is meeting with her therapist twice a week and her psychiatrist in the near future. She is interested in increasing her Zoloft further - from 25 to 50 mg daily. She has been doing a better job with eating small, but regular meals and exercising regularly again. She intends to enroll in an outpatient mental health/eating disorder clinic when able and does not think she can return to work until completing this outpatient treatment program (which is currently booked out some time).     OBJECTIVE:                                                      General: appears well  Psychiatric: normal mood and affect    ASSESSMENT/PLAN:                                                      (Z09) Hospital discharge follow-up  (primary encounter diagnosis)  (F42.9) Obsessive-compulsive disorder, unspecified type  (F50.01) Anorexia nervosa, restricting type  (F41.1) ADDY (generalized anxiety disorder)  (F43.10) PTSD (post-traumatic stress disorder)  (F33.1) Moderate episode of recurrent major depressive disorder (H)  Comment: patient continues to do poorly from a mental health perspective.  Plan: she should continue her therapy sessions and psychiatry follow-up as scheduled; agree with outpatient treatment program when able; time off extended until September 8th, 2020.    Total time of video call between patient and provider was 12 minutes.    (Chart documentation was completed, in part, with [employer] voice-recognition software. Even though reviewed, some grammatical, spelling, and word errors may remain.)    Josselin Diamond MD   [address] [address]  T: [phone], F: [phone]

## 2020-07-15 ENCOUNTER — TELEPHONE (OUTPATIENT)
Dept: INTERNAL MEDICINE | Facility: CLINIC | Age: 30
End: 2020-07-15

## 2020-07-15 NOTE — TELEPHONE ENCOUNTER
Reason for call:  Form   Our goal is to have forms completed within 72 hours, however some forms may require a visit or additional information.     Who is the form from? Insurance comp  Where did the form come from? form was faxed in  What clinic location was the form placed at? [facility]  Where was the form placed? Given to physician  What number is listed as a contact on the form? [phone]    Phone call message - patient request for a letter, form or note:     Date needed: as soon as possible  Please fax to [phone]  Has the patient signed a consent form for release of information? NO    Additional comments:     Type of letter, form or note: disability    Phone number to reach patient:  Cell number on file:    Telephone Information:   Mobile [phone]       Best Time:      Can we leave a detailed message on this number?  YES    Travel screening: Not Applicable

## 2020-07-16 ENCOUNTER — PATIENT OUTREACH (OUTPATIENT)
Dept: NURSING | Facility: CLINIC | Age: 30
End: 2020-07-16
Payer: COMMERCIAL

## 2020-07-16 ASSESSMENT — ACTIVITIES OF DAILY LIVING (ADL): DEPENDENT_IADLS:: INDEPENDENT

## 2020-07-16 NOTE — TELEPHONE ENCOUNTER
Regi from the [facility] called.  She received the form but not the current office notes.  Please fax notes to fax # [phone].  For questions, call [phone].

## 2020-07-16 NOTE — PROGRESS NOTES
"Clinic Care Coordination Contact    Follow Up Progress Note      Assessment:    When asked how she has been doing, patient replied \"I'm alive\".  She stated she had just experienced a panic attack in the setting of her father getting too close to her, which she reported has been a trigger for her.  However, she stated that she only felt angry for about 30 seconds and then was able to deescalate herself.  She stated these episodes have been shorter as compared to previous when they sometimes lasted up to 10 minutes.    Patient has been continuing to see her therapist 2x/week which she stated does help but at this point she is just in \"maintenance mode\" as she waits to get into an OCD program.  In the meantime, she and her therapist have been discussing the value of breathing and distraction exercises.    Patient stated she was originally told the wait list for the OCD program at [facility] were about 3-5 weeks, but she updated that she did call them again yesterday for an update and they stated that the wait for new patient is normally about 10 weeks.  She is on the waiting list for both the outpatient and residential programs.    Patient did have a follow-up appointment with PCP on 7/6/20 and stated that went well.  Her Zoloft was increased to 50 mg daily so she has been on this increased dose for about a week now.    On an as-needed basis, patient has been taking gabapentin and lorazepam as prescribed.    Patient reported she has been eating again.  She has always been more of a \"grazer\" so that is what she has continued to do, but she is eating whatever she wants for calories/carbs.  She's also been working out some.    Currently, patient is on FMLA from her job.  The FMLA started 6/8/20 and will go to 9/8/20.  She is wondering what her options would be should she need more time away from work pending when she gets into the OCD program.    Goals addressed this encounter:   Goals Addressed              "    This Visit's Progress       Patient Stated      1. Mental Health Management (pt-stated)   30%     Goal Statement: I want to improve my overall mental health.  Date Goal set: 6/25/20  Barriers: personal and work stress related to COVID-19, multiple contributing diagnoses (anxiety, depression)  Strengths: established relationship with therapist, willingness to participate in treatment, supportive parents  Date to Achieve By: 12/31/20  Patient expressed understanding of goal: Yes  Action steps to achieve this goal:  1. I will participate in a program for treatment of my OCD.  2. I will continue regularly following up with therapy/psychiatry for my OCD, anxiety, and depression.  3. I will take my medications daily as prescribed and will notify my care team of any side effects or concerns with my medications prior to stopping/changing them.  4. I will explore additional resources for mental health support.  5. I will continue working with Care Coordination to address any barriers to achieving my goal.        Intervention/Education provided during outreach: CC RN provided the patient with encouragement for noting some small improvements in terms of her ability to deescalate when she is experiencing anxiety/panic as well as for continuing to work with her therapist regularly.  CC RN provided supportive listening when patient admitted frustration with the wait time for getting into an OCD program, however she is willing to wait as other programs for mental health are not OCD-specific, so she feels this one will be the best-suited for her.  Per discussion with MARCOS HANSEN colleagues, CC RN advised patient to contact the HR department at her job and inquire about her options of extending her leave of absence if needed; asked that she update CC RN after speaking with them.     Outreach Frequency: 2 weeks    Plan:     The patient will contact her HR department to discuss options of extending leave of absence, if needed.    The  patient will continue attending therapy 2x/week.    The patient will continue taking her medications as prescribed.    The patient will await updates from [facility] regarding her status on the wait list for their OCD program.    The patient agreed to contact CC RN with additional questions or concerns.    Care Coordination will outreach to patient in approximately 2 weeks to get updates on patient status, assess goal progress, and offer additional support and resources as indicated.    Antonio Escalona RN  Clinic Care Coordinator  [facility]  Ph: [phone]

## 2020-07-22 NOTE — TELEPHONE ENCOUNTER
Regi with [employer] [facility] called with questions regarding the patients return to work date since there seems to be some confusion. The patient is under the impression that she will not be returning to work until September but paperwork that Dr. Diamond recently filled out states otherwise. If this is the case she will need visit notes and documentation supporting this. She would like a call back to discuss the patients actual return to work date.

## 2020-07-22 NOTE — TELEPHONE ENCOUNTER
Looked at form and it states 9/8 as the return to work date in 2 places. I can re-fax it if they want.

## 2020-07-24 NOTE — TELEPHONE ENCOUNTER
Pt was called and she thinks that her therapist might have sent the information requested but, it was checked with Antonio at [facility] and he mentioned that as far as he can tell they received all the information needed. Regi, who called before is out of the office but, if any additional information is needed she will call back to let us know what they need. At this time no further action is required.

## 2020-08-10 ENCOUNTER — PATIENT OUTREACH (OUTPATIENT)
Dept: NURSING | Facility: CLINIC | Age: 30
End: 2020-08-10
Payer: COMMERCIAL

## 2020-08-10 ASSESSMENT — ACTIVITIES OF DAILY LIVING (ADL): DEPENDENT_IADLS:: INDEPENDENT

## 2020-08-10 NOTE — PROGRESS NOTES
"Clinic Care Coordination Contact    Follow Up Progress Note     Assessment:    Patient updated she was able to get into the OCD program at [facility] which she stated has been \"going good\".  She couldn't recall specific date but thinks she started on/around 7/29/20.  The program will be approximately 8 weeks total but she stated there is no definite end date and will depend on her progress.    Patient stated everything is okay with her insurance/FMLA at this time.  She agreed to alert clinic/CC RN should any additional needs arise with that.  Her current paperwork is good until 9/8/20.    Patient stated that someone with her OCD program mentioned that it might be helpful for her to get a nutritionist; she's wondering how she should go about this.    Patient otherwise denied outstanding questions or concerns at this time.    Goals addressed this encounter:   Goals Addressed                 This Visit's Progress       Patient Stated      1. Mental Health Management (pt-stated)   40%     Goal Statement: I want to improve my overall mental health.  Date Goal set: 6/25/20  Barriers: personal and work stress related to COVID-19, multiple contributing diagnoses (anxiety, depression)  Strengths: established relationship with therapist, willingness to participate in treatment, supportive parents  Date to Achieve By: 12/31/20  Patient expressed understanding of goal: Yes  Action steps to achieve this goal:  1. I will participate in a program for treatment of my OCD.  2. I will continue regularly following up with therapy/psychiatry for my OCD, anxiety, and depression.  3. I will take my medications daily as prescribed and will notify my care team of any side effects or concerns with my medications prior to stopping/changing them.  4. I will explore additional resources for mental health support.  5. I will continue working with Care Coordination to address any barriers to achieving my goal.      "   Intervention/Education provided during outreach: CC RN encouraged patient to check with her insurance, as a starting point, to inquire about coverage for a nutrition consult; she agreed to do so and will follow up with CC RN after.  CC RN informed patient a nutrition referral could be placed pending her insurance coverage; she stated understanding.    Outreach Frequency: monthly    Plan:     The patient will continue attending the OCD program at [facility] as indicated.    The patient will inquire with her insurance about coverage for a nutrition referral and will update CC RN with what she finds out.    The patient agreed to contact clinic/CC RN should she need additional assistance for her FMLA.    Care Coordination will outreach to patient in approximately 1 month to get updates on patient status, assess goal progress, and offer additional support and resources as indicated.    Antonio Escalona RN  Clinic Care Coordinator  [facility]  Ph: [phone]

## 2020-09-22 ENCOUNTER — PATIENT OUTREACH (OUTPATIENT)
Dept: CARE COORDINATION | Facility: CLINIC | Age: 30
End: 2020-09-22

## 2020-09-22 ASSESSMENT — ACTIVITIES OF DAILY LIVING (ADL): DEPENDENT_IADLS:: INDEPENDENT

## 2020-09-22 NOTE — PROGRESS NOTES
Clinic Care Coordination Contact  [facility]/Voicemail    Referral Source: IP Report  Clinical Data: Care Coordinator Outreach  CC RN outreach to get updates on patient status, assess goal progress, and provide support and additional resources as needed.    Outreach attempted x 1.  Left message on patient's voicemail with call back information and requested return call.    Plan: Care Coordinator will try to reach patient again in approximately  7-10 business days.    Antonio Escalona RN  Clinic Care Coordinator  [facility]  Ph: [phone]

## 2020-10-14 ENCOUNTER — PATIENT OUTREACH (OUTPATIENT)
Dept: NURSING | Facility: CLINIC | Age: 30
End: 2020-10-14
Payer: COMMERCIAL

## 2020-10-14 ASSESSMENT — ACTIVITIES OF DAILY LIVING (ADL): DEPENDENT_IADLS:: INDEPENDENT

## 2020-10-14 NOTE — LETTER
[place] CARE COORDINATION  [facility]  [address], [place] [address]    October 14, 2020        Diana Altamirano  [address]  [facility] [address]          Dear Heron Ortiz is an updated Complex Care Plan for your continued enrollment in Care Coordination. Please let us know if you have additional questions, concerns, or goals that we can assist with.    Sincerely,    Antonio Esclaona RN        [facility]  Complex Care Plan  About Me:    Patient Name:  Diana Altamirano    YOB: 1990  Age:         30 year old   Jonathan MRN:    [id number] Telephone Information:  Home Phone [phone]   Mobile [phone]       Address:  [address] [facility] [address] Email address:  [email or web address]      Emergency Contact(s)    Name Relationship Lgl Grd Work Phone Home Phone Mobile Phone   1. KODY BAUTISTA Spouse   [phone]   2. OFFICER MILKA* Mother   [phone]            Primary language:  English     needed? No   [facility]:  [phone] op. 1  Other communication barriers: None  Preferred Method of Communication:     Current living arrangement: I live in a private home(currently living with parents)  Mobility Status/ Medical Equipment: Independent    Health Maintenance  Health Maintenance Reviewed: Due/Overdue   Health Maintenance Due   Topic Date Due     DEPRESSION ACTION PLAN  1990     HIV SCREENING  02/11/2003     PHQ-9  12/27/2019     PREVENTIVE CARE VISIT  06/27/2020     INFLUENZA VACCINE (1) 09/01/2020     My Access Plan  Medical Emergency 911   Primary Clinic Line [facility] - [phone]   24 Hour Appointment Line [phone] or  [phone]) (toll-free)   24 Hour Nurse Line [phone] (toll-free)   Preferred Urgent Care [facility], [phone]   [facility]   [phone]   Preferred Pharmacy [id number] [address] [facility]     Behavioral Health Crisis Line The National Suicide Prevention Lifeline at 1-125.306.7291 or 911     My Care Team Members  Patient Care Team       Relationship Specialty Notifications Start End    Josselin Diamond MD PCP - General Internal Medicine  6/27/19     Phone: [phone] Fax: [phone]         [address] [place] [address]    Josselin Diamond MD Assigned PCP   7/7/19     Phone: [phone] Fax: [phone]         [address] [place] [address]    uEsebio Rojas    6/25/20     Phone: [phone] Fax: [phone]         [place] [id number] [facility] [address]    Yanelis Herrera    6/25/20     ph: [phone]    Phone: [phone] Fax: [phone]         XXX RESIGNED XXX [address] [facility] [address]    Antonio Escalona, RN Lead Care Coordinator Primary Care - CC  6/25/20     Phone: [phone]         Layla Quintanilla MA Community Health Worker Primary Care - CC  8/10/20     ph: [phone]    Phone: [phone]                 My Care Plans  Self Management and Treatment Plan  Goals and (Comments)  Goals        General    1. Mental Health Management (pt-stated)     Notes - Note edited  10/14/2020  2:31 PM by Antonio Escaolna, RN    Goal Statement: I want to improve my overall mental health.  Date Goal set: 6/25/20  Barriers: personal and work stress related to COVID-19, multiple contributing diagnoses (anxiety, depression)  Strengths: established relationship with therapist, willingness to participate in treatment, supportive parents  Date to Achieve By: 12/31/20  Patient expressed understanding of goal: Yes    Action steps to achieve this goal:  1. I will participate in a program for treatment of my OCD.  2. I will continue regularly following up with therapy/psychiatry for my OCD, anxiety, and depression.  3. I will take my medications daily as  prescribed and will notify my care team of any side effects or concerns with my medications prior to stopping/changing them.  4. I will explore additional resources for mental health support.  5. I will continue working with Care Coordination to address any barriers to achieving my goal.           Action Plans on File:   None    Advance Care Plans/Directives Type:   None on file       My Medical and Care Information  Problem List   Patient Active Problem List   Diagnosis     ADDY (generalized anxiety disorder)     MDD (major depressive disorder)     OCD (obsessive compulsive disorder)     Anorexia     PTSD (post-traumatic stress disorder)      Current Medications:  Current Outpatient Medications   Medication Instructions     gabapentin (NEURONTIN) 200 mg, Oral, 2 TIMES DAILY PRN     LORazepam (ATIVAN) 0.5 mg, Oral, AT BEDTIME PRN     sertraline (ZOLOFT) 50 mg, Oral, DAILY     Care Coordination Start Date: 6/25/2020   Frequency of Care Coordination: monthly   Form Last Updated: 10/14/2020

## 2020-10-14 NOTE — PROGRESS NOTES
"Clinic Care Coordination Contact    Follow Up Progress Note      Assessment:    Patient updated that she continues to be enrolled in the OCD program at [facility].  She stated this has continued to go well, though it is challenging.    She attends the program every week day from 8:30 am to 2:30 pm and then has homework assigned to her each night.    Her homework is to work on her \"exposures\", meaning exposing herself to things that trigger her OCD/anxiety and then she has to work to cope with that and work through her impulses.    Patient noted that she has been able to meet a lot of new people who are also participating in the program and has found being able to relate to others and them relate to her to be very helpful and encouraging.    Patient isn't sure how much longer she will be enrolled; she feels she might be able to discharge sometime soon but not timelines have been discussed.    Patient doesn't think she needs anything from clinic/care team regarding her absence from work but stated she will check into this and notify CC RN if anything is needed.    Goals addressed this encounter:   Goals Addressed                 This Visit's Progress       Patient Stated      1. Mental Health Management (pt-stated)   50%     Goal Statement: I want to improve my overall mental health.  Date Goal set: 6/25/20  Barriers: personal and work stress related to COVID-19, multiple contributing diagnoses (anxiety, depression)  Strengths: established relationship with therapist, willingness to participate in treatment, supportive parents  Date to Achieve By: 12/31/20  Patient expressed understanding of goal: Yes    Action steps to achieve this goal:  1. I will participate in a program for treatment of my OCD.  2. I will continue regularly following up with therapy/psychiatry for my OCD, anxiety, and depression.  3. I will take my medications daily as prescribed and will notify my care team of any side effects or " concerns with my medications prior to stopping/changing them.  4. I will explore additional resources for mental health support.  5. I will continue working with Care Coordination to address any barriers to achieving my goal.        Intervention/Education provided during outreach:    CC RN provided therapeutic listening while patient discussed her program and her ongoing efforts to make progress with her OCD.    CC RN provided the patient with encouragement and instructed her to be sure to call if she has any needs; she agreed to do so.     Outreach Frequency: monthly    Plan:     Patient will continue with her OCD program at [facility] as indicated.    Patient agreed to notify CC RN/clinic if she has additional needs related to her absence from work.    Patient will contact CC RN with any additional questions or concerns.    Care Coordination will outreach to patient in approximately 3 weeks to get updates on patient status, assess goal progress, and offer additional support and resources as indicated.    Antonio Escalona RN  Clinic Care Coordinator  [facility]  Ph: [phone]

## 2020-10-26 ENCOUNTER — TELEPHONE (OUTPATIENT)
Dept: CARE COORDINATION | Facility: CLINIC | Age: 30
End: 2020-10-26

## 2020-10-26 NOTE — TELEPHONE ENCOUNTER
CC RN received voicemail message from patient requesting call back to discuss upcoming change to her OCD program schedule and need for updated documentation from PCP.    CC RN called and spoke with patient who updated that as of 11/2/20, she will step down to only M-F 3:00 pm - 6:00 pm with her OCD program at [facility] since she has been progressing well.      Patient needs a letter from PCP to be sent to [employer] [facility] (ph: [phone] fax: [phone]) stating that as of 11/2/20, she will continue to participate in her OCD program on M-F from 3:00 pm to 6:00 pm and therefore needs to work a reduced-hour schedule at her place of employment.  Patient is requesting clearance to work a maximum of 6 hours/shift to allow her time to commute from work to her program.  The letter can be faxed to [employer] [facility] at [phone].    CC RN will send request to PCP for review and response.    Antonio Escalona RN  Clinic Care Coordinator  [facility]  Ph: [phone]

## 2020-10-26 NOTE — LETTER
10/26/20      Diana Altamirano  1990  [address] [facility] [address]        Please fax to [employer] [facility] at Fax# [phone].    To whom it may concerns,    As of November 2, 2020, Ms Altamirano will continue to participate in her OCD program Mondays through Fridays from 3:00 pm to 6:00. Ms Altamirano may work a maximum of 6 hours/shift. This will allow her time to commute from work to her program.      Please contact me with any question or concerns.        Josselin Diamond MD   [facility]  [address] [place] [place] [address]  T: [phone], F: [phone]

## 2020-11-16 ENCOUNTER — PATIENT OUTREACH (OUTPATIENT)
Dept: NURSING | Facility: CLINIC | Age: 30
End: 2020-11-16
Payer: COMMERCIAL

## 2020-11-16 DIAGNOSIS — F42.9 OBSESSIVE-COMPULSIVE DISORDER, UNSPECIFIED TYPE: Primary | ICD-10-CM

## 2020-11-16 DIAGNOSIS — F50.019 ANOREXIA NERVOSA, RESTRICTING TYPE: ICD-10-CM

## 2020-11-16 DIAGNOSIS — F41.1 GAD (GENERALIZED ANXIETY DISORDER): ICD-10-CM

## 2020-11-16 DIAGNOSIS — F33.1 MODERATE EPISODE OF RECURRENT MAJOR DEPRESSIVE DISORDER (H): ICD-10-CM

## 2020-11-16 DIAGNOSIS — F43.10 PTSD (POST-TRAUMATIC STRESS DISORDER): ICD-10-CM

## 2020-11-16 ASSESSMENT — ACTIVITIES OF DAILY LIVING (ADL): DEPENDENT_IADLS:: INDEPENDENT

## 2020-11-16 NOTE — PROGRESS NOTES
"Clinic Care Coordination Contact    Follow Up Progress Note      Assessment:    Patient updated that things have been going fairly well.    Patient reported that she has now stepped down to a shorter OCD program through [facility] as of 11/2/20.  She attends the program M-F from 3:00 pm - 6:00 pm.  She is currently on week 3 of the program; it is typically between 6-8 weeks in duration.    Following completion of current reduced-hours program, patient will then transition to attending therapy 2x/week for OCD-specific therapy.  She has been encouraged to also continue seeing the therapist that she had prior as well as establishing with psychiatry.    Patient has return to work; her current shift has been 5:45 am - 12:00 pm.  She noted that her first week back at work was difficult and she had a couple of panic attacks, but she is now \"getting back into the swing of things\".    Patient confirmed she received the necessary paperwork/letters for [employer] [facility].  She ended up getting some of it taken care of by her RN with her program at [facility].    Goals addressed this encounter:   Goals Addressed                 This Visit's Progress       Patient Stated      1. Mental Health Management (pt-stated)   60%     Goal Statement: I want to improve my overall mental health.  Date Goal set: 6/25/20  Barriers: personal and work stress related to COVID-19, multiple contributing diagnoses (anxiety, depression)  Strengths: established relationship with therapist, willingness to participate in treatment, supportive parents  Date to Achieve By: 12/31/20  Patient expressed understanding of goal: Yes    Action steps to achieve this goal:  1. I will participate in a program for treatment of my OCD.  2. I will continue regularly following up with therapy/psychiatry for my OCD, anxiety, and depression.  3. I will take my medications daily as prescribed and will notify my care team of any side effects or " concerns with my medications prior to stopping/changing them.  4. I will explore additional resources for mental health support.  5. I will continue working with Care Coordination to address any barriers to achieving my goal.        Intervention/Education provided during outreach:    CC RN provided patient with encouragement for continued progression through her OCD program at [facility] as well as her recent return to work and her ability to maintain her mental health through the changes.    CC RN inquired if patient would like a Mental Health Referral from PCP to facilitate her establishing with Psychiatry; patient would appreciate this.    CC RN assessed for additional patient questions or concerns at this time; she denied any at present.     Outreach Frequency: monthly    Plan:    Patient will continue her OCD program through [facility].    Patient will continue working reduced hours at work and will notify PCP/CC RN if any additional documentation is needed.    CC RN will collaborate with PCP to ensure referral placed for Mental Health - Psychiatry per patient's request.    Patient agreed to contact CC RN with additional questions, concerns, or updates.    CC RN will outreach to patient in approximately 1 month to get updates on patient status, assess goal progress, and offer additional support and resources as indicated.    Antonio Escalona, RN  Clinic Care Coordinator  [facility]  Ph: [phone]

## 2020-11-24 ENCOUNTER — OFFICE VISIT (OUTPATIENT)
Dept: INTERNAL MEDICINE | Facility: CLINIC | Age: 30
End: 2020-11-24
Payer: COMMERCIAL

## 2020-11-24 VITALS
HEART RATE: 110 BPM | DIASTOLIC BLOOD PRESSURE: 70 MMHG | BODY MASS INDEX: 21.2 KG/M2 | WEIGHT: 128.8 LBS | RESPIRATION RATE: 16 BRPM | OXYGEN SATURATION: 99 % | SYSTOLIC BLOOD PRESSURE: 140 MMHG | TEMPERATURE: 97.8 F

## 2020-11-24 DIAGNOSIS — F42.9 OBSESSIVE-COMPULSIVE DISORDER, UNSPECIFIED TYPE: ICD-10-CM

## 2020-11-24 DIAGNOSIS — N83.201 CYST OF RIGHT OVARY: Primary | ICD-10-CM

## 2020-11-24 DIAGNOSIS — R10.31 ABDOMINAL PAIN, RIGHT LOWER QUADRANT: ICD-10-CM

## 2020-11-24 LAB
ALBUMIN SERPL-MCNC: 4.3 G/DL (ref 3.4–5)
ALBUMIN UR-MCNC: NEGATIVE MG/DL
ALP SERPL-CCNC: 50 U/L (ref 40–150)
ALT SERPL W P-5'-P-CCNC: 19 U/L (ref 0–50)
ANION GAP SERPL CALCULATED.3IONS-SCNC: 2 MMOL/L (ref 3–14)
APPEARANCE UR: CLEAR
AST SERPL W P-5'-P-CCNC: 21 U/L (ref 0–45)
BILIRUB SERPL-MCNC: 0.6 MG/DL (ref 0.2–1.3)
BILIRUB UR QL STRIP: NEGATIVE
BUN SERPL-MCNC: 14 MG/DL (ref 7–30)
CALCIUM SERPL-MCNC: 9.4 MG/DL (ref 8.5–10.1)
CHLORIDE SERPL-SCNC: 106 MMOL/L (ref 94–109)
CO2 SERPL-SCNC: 28 MMOL/L (ref 20–32)
COLOR UR AUTO: YELLOW
CREAT SERPL-MCNC: 0.68 MG/DL (ref 0.52–1.04)
ERYTHROCYTE [DISTWIDTH] IN BLOOD BY AUTOMATED COUNT: 12.6 % (ref 10–15)
GFR SERPL CREATININE-BSD FRML MDRD: >90 ML/MIN/{1.73_M2}
GLUCOSE SERPL-MCNC: 93 MG/DL (ref 70–99)
GLUCOSE UR STRIP-MCNC: NEGATIVE MG/DL
HCT VFR BLD AUTO: 38.9 % (ref 35–47)
HGB BLD-MCNC: 13 G/DL (ref 11.7–15.7)
HGB UR QL STRIP: ABNORMAL
KETONES UR STRIP-MCNC: NEGATIVE MG/DL
LEUKOCYTE ESTERASE UR QL STRIP: NEGATIVE
MCH RBC QN AUTO: 29.7 PG (ref 26.5–33)
MCHC RBC AUTO-ENTMCNC: 33.4 G/DL (ref 31.5–36.5)
MCV RBC AUTO: 89 FL (ref 78–100)
NITRATE UR QL: NEGATIVE
NON-SQ EPI CELLS #/AREA URNS LPF: NORMAL /LPF
PH UR STRIP: 6 PH (ref 5–7)
PLATELET # BLD AUTO: 210 10E9/L (ref 150–450)
POTASSIUM SERPL-SCNC: 3.9 MMOL/L (ref 3.4–5.3)
PROT SERPL-MCNC: 8.1 G/DL (ref 6.8–8.8)
RBC # BLD AUTO: 4.38 10E12/L (ref 3.8–5.2)
RBC #/AREA URNS AUTO: NORMAL /HPF
SODIUM SERPL-SCNC: 136 MMOL/L (ref 133–144)
SOURCE: ABNORMAL
SP GR UR STRIP: <=1.005 (ref 1–1.03)
UROBILINOGEN UR STRIP-ACNC: 0.2 EU/DL (ref 0.2–1)
WBC # BLD AUTO: 7 10E9/L (ref 4–11)
WBC #/AREA URNS AUTO: NORMAL /HPF

## 2020-11-24 PROCEDURE — 80053 COMPREHEN METABOLIC PANEL: CPT | Performed by: INTERNAL MEDICINE

## 2020-11-24 PROCEDURE — 36415 COLL VENOUS BLD VENIPUNCTURE: CPT | Performed by: INTERNAL MEDICINE

## 2020-11-24 PROCEDURE — 99214 OFFICE O/P EST MOD 30 MIN: CPT | Performed by: INTERNAL MEDICINE

## 2020-11-24 PROCEDURE — 81001 URINALYSIS AUTO W/SCOPE: CPT | Performed by: INTERNAL MEDICINE

## 2020-11-24 PROCEDURE — 85027 COMPLETE CBC AUTOMATED: CPT | Performed by: INTERNAL MEDICINE

## 2020-11-24 NOTE — PATIENT INSTRUCTIONS
*  No evidence for infection.     *  Most probably ovarian cyst    *  take over the counter Motrin/Ibuprofen/Advil or Aleve to help relieve pain and inflammation.  follow the directions on the bottle.  Be sure to take with a small meal to prevent stomach upset.      --Motrin 600 mg ( 3 x 200 mg tablets) three times per day every day for 3-4 days, then 2-3 times per day as needed (take with food to avoid stomach side effects)     OR     --Aleve 2 tablets twice per day for 3-4 days every day, then twice per day as needed     *  Pelvic ultrasound to evaluate the ovaries and uterus to confirm what is there.     *  Referral to Gynecologist at our clinic to further evaluate.

## 2020-11-24 NOTE — PROGRESS NOTES
Subjective     Diana Altamriano is a 30 year old female who presents to clinic today for the following health issues:    HPI         Pt has some right side pelvic pain a little bit over from the hip bone has been going on for about 1 week.  Right lower abdominal pain;    NO vomiting, nausea.   No changes in urination  No fevers, no chills.   No changes in BMs, possible mild constipation like sensation.   Pt did spot for a week but, she is in her ovulation week. Also, is having some periodically pain down right leg. Did have some breast tenderness.     The pain has been steady and nonstop mostly, with occ waxing and waning.    Has a history of ovarian cysts in a similar location with nearly identical presentation.  Has a family history of severe ovarian cysts with her mother and her sister, who eventually required special medicine for this.    Last menstrual cycle started November 1.  Menses have been normal.      2.  History anxiety/PTSD/OCD.  Followed by a psychiatry clinic in [place].  Currently taking sertraline, has been titrating upward slightly on the direction of her psychiatry team.  She feels this medicine is helping    **I reviewed the information recorded in the patient's EPIC chart (including but not limited to medical history, surgical history, family history, problem list, medication list, and allergy list) and updated the information as indicated based on the patients reported information.         Review of Systems   Constitutional, HEENT, cardiovascular, pulmonary, gi and gu systems are negative, except as otherwise noted.      Objective    BP (!) 140/70 (BP Location: Left arm, Patient Position: Chair, Cuff Size: Adult Regular)   Pulse 110   Temp 97.8  F (36.6  C) (Temporal)   Resp 16   Wt 58.4 kg (128 lb 12.8 oz)   LMP 11/01/2020   SpO2 99%   BMI 21.20 kg/m    Body mass index is 21.2 kg/m .  Physical Exam   GENERAL alert and no distress  EYES:  Normal sclera,conjunctiva, EOMI  HENT: oral and  posterior pharynx without lesions or erythema, facies symmetric  NECK: Neck supple. No LAD, without thyroidmegaly.  RESP: Clear to ausculation bilaterally without wheezes or crackles. Normal BS in all fields.  CV: RRR normal S1S2 without murmurs, rubs or gallops.  LYMPH: no cervical lymph adenopathy appreciated  MS: extremities- no gross deformities of the visible extremities noted,   EXT:  no lower extremity edema  PSYCH: Alert and oriented times 3; speech- coherent  SKIN:  No obvious significant skin lesions on visible portions of face   PELVIC: Normal vaginal canal, normal-appearing cervix, there is no obvious abnormal discharge in the vaginal vault or cervix.  There is tenderness in the right adnexal area, some mild enlargement and tenderness specifically with palpation of the right ovary, reproduces her symptoms exactly.  No tenderness or pain with palpation of the left ovary.    **Rooming staff was present with me in the exam room during the entire physical examination.      Results for orders placed or performed in visit on 11/24/20   CBC with platelets     Status: None   Result Value Ref [facility]    WBC 7.0 4.0 - 11.0 10e9/L    RBC Count 4.38 3.8 - 5.2 10e12/L    Hemoglobin 13.0 11.7 - 15.7 g/dL    Hematocrit 38.9 35.0 - 47.0 %    MCV 89 78 - 100 fl    MCH 29.7 26.5 - 33.0 pg    MCHC 33.4 31.5 - 36.5 g/dL    RDW 12.6 10.0 - 15.0 %    Platelet Count 210 150 - 450 10e9/L   Comprehensive metabolic panel     Status: Abnormal   Result Value Ref [facility]    Sodium 136 133 - 144 mmol/L    Potassium 3.9 3.4 - 5.3 mmol/L    Chloride 106 94 - 109 mmol/L    Carbon Dioxide 28 20 - 32 mmol/L    Anion Gap 2 (L) 3 - 14 mmol/L    Glucose 93 70 - 99 mg/dL    Urea Nitrogen 14 7 - 30 mg/dL    Creatinine 0.68 0.52 - 1.04 mg/dL    GFR Estimate >90 >60 mL/min/[1.73_m2]    GFR Estimate If Black >90 >60 mL/min/[1.73_m2]    Calcium 9.4 8.5 - 10.1 mg/dL    Bilirubin Total 0.6 0.2 - 1.3 mg/dL    Albumin 4.3 3.4 - 5.0 g/dL    Protein Total  8.1 6.8 - 8.8 g/dL    Alkaline Phosphatase 50 40 - 150 U/L    ALT 19 0 - 50 U/L    AST 21 0 - 45 U/L   *UA reflex to Microscopic and Culture ([place] and [facility] (except [place] and [place])     Status: Abnormal    Specimen: Midstream Urine   Result Value Ref [facility]    Color Urine Yellow     Appearance Urine Clear     Glucose Urine Negative NEG^Negative mg/dL    Bilirubin Urine Negative NEG^Negative    Ketones Urine Negative NEG^Negative mg/dL    Specific Gravity Urine <=1.005 1.003 - 1.035    Blood Urine Trace (A) NEG^Negative    pH Urine 6.0 5.0 - 7.0 pH    Protein Albumin Urine Negative NEG^Negative mg/dL    Urobilinogen Urine 0.2 0.2 - 1.0 EU/dL    Nitrite Urine Negative NEG^Negative    Leukocyte Esterase Urine Negative NEG^Negative    Source Midstream Urine    Urine Microscopic     Status: None   Result Value Ref [facility]    WBC Urine 0 - 5 OTO5^0 - 5 /HPF    RBC Urine O - 2 OTO2^O - 2 /HPF    Squamous Epithelial /LPF Urine Few FEW^Few /LPF              (N83.201) Cyst of right ovary  (primary encounter diagnosis)  Comment:   *  No evidence for infection.     *  Most probably ovarian cyst    *  take over the counter Motrin/Ibuprofen/Advil or Aleve to help relieve pain and inflammation.  follow the directions on the bottle.  Be sure to take with a small meal to prevent stomach upset.      --Motrin 600 mg ( 3 x 200 mg tablets) three times per day every day for 3-4 days, then 2-3 times per day as needed (take with food to avoid stomach side effects)     OR     --Aleve 2 tablets twice per day for 3-4 days every day, then twice per day as needed     *  Pelvic ultrasound to evaluate the ovaries and uterus to confirm what is there.     *  Referral to Gynecologist at our clinic to further evaluate.      Plan: US Pelvic Complete with Transvaginal            (R10.31) Abdominal pain, right lower quadrant  Comment: No evidence for infection, most likely ovarian cyst, as she had several years ago.  Arrange for pelvic  ultrasound, follow-up with gynecology clinic.  Plan: CBC with platelets, Comprehensive metabolic         panel, *UA reflex to Microscopic and Culture         ([facility] (except [place]        and [place]), Urine Microscopic, US Pelvic         Complete with Transvaginal            (F42.9) Obsessive-compulsive disorder, unspecified type  Comment: Followed by an outside provider, dose of sertraline has been titrated upwards to its current levels.  I recommend that she continue follow-up with her current mental health care team.  Plan: sertraline (ZOLOFT) 50 MG tablet

## 2020-11-25 ENCOUNTER — TELEPHONE (OUTPATIENT)
Dept: PSYCHIATRY | Facility: CLINIC | Age: 30
End: 2020-11-25

## 2020-11-25 NOTE — TELEPHONE ENCOUNTER
[facility]      Patient Name:  Diana Altamirano  /Age:  1990 (30 year old)      Intervention: Called patient to follow up on Mental Health Referral placed by Josselin Diamond MD, on 20. Patient was informed of estimated 6-8 month wait time and clinic and decided to look elsewhere for sooner services.    Status of Referral: Removed from work queue.    Plan: No further action needed at this time.      Rosalia Sweet,     [facility]

## 2020-12-24 ENCOUNTER — PATIENT OUTREACH (OUTPATIENT)
Dept: CARE COORDINATION | Facility: CLINIC | Age: 30
End: 2020-12-24

## 2020-12-27 ENCOUNTER — HEALTH MAINTENANCE LETTER (OUTPATIENT)
Age: 30
End: 2020-12-27

## 2020-12-31 ENCOUNTER — PATIENT OUTREACH (OUTPATIENT)
Dept: CARE COORDINATION | Facility: CLINIC | Age: 30
End: 2020-12-31

## 2020-12-31 NOTE — PROGRESS NOTES
Clinic Care Coordination Contact  [facility]/Voicemail       Clinical Data: Care Coordinator Outreach    Outreach attempted x 2.  Left message on patient's voicemail with call back information and requested return call.    Plan: Care Coordinator will try to reach patient again in 1 month.    .Maryjane Thomson [facility]  Clinic Care Coordinator  [facility] Joanne [place]  [facility]  [phone]  [email or web address]

## 2021-01-20 ENCOUNTER — PATIENT OUTREACH (OUTPATIENT)
Dept: CARE COORDINATION | Facility: CLINIC | Age: 31
End: 2021-01-20

## 2021-01-20 NOTE — PROGRESS NOTES
Clinic Care Coordination Contact  [facility]/Voicemail       Clinical Data: Care Coordinator Outreach    Outreach attempted x 3.  Left message on patient's voicemail with call back information and requested return call.    Plan: Care Coordinator will send disenrollment letter with care coordinator contact information via [employer]. Care Coordinator will do no further outreaches at this time.    Maryjane Thomson [facility]  Clinic Care Coordinator  [facility] Joanne Lamb  [facility]  [phone]  [email or web address]

## 2021-01-20 NOTE — LETTER
[place] CARE COORDINATION  [address] [address]    January 20, 2021    Diana Altamirano  [address] [place] [id number]      Dear Diana,    I have been unsuccessful in reaching you since our last contact. At this time the Care Coordination team will make no further attempts to reach you, however this does not change your ability to continue receiving care from your providers at your primary care clinic. If you need additional support from a care coordinator in the future please contact me at [phone].    All of us at [facility] are invested in your health and are here to assist you in meeting your goals.     Sincerely,    Maryjane Thomson [facility]  Clinic Care Coordinator  [facility]  [phone]

## 2021-06-30 ENCOUNTER — VIRTUAL VISIT (OUTPATIENT)
Dept: INTERNAL MEDICINE | Facility: CLINIC | Age: 31
End: 2021-06-30
Payer: COMMERCIAL

## 2021-06-30 DIAGNOSIS — F42.9 OBSESSIVE-COMPULSIVE DISORDER, UNSPECIFIED TYPE: Primary | ICD-10-CM

## 2021-06-30 DIAGNOSIS — F33.42 RECURRENT MAJOR DEPRESSIVE DISORDER, IN FULL REMISSION (H): ICD-10-CM

## 2021-06-30 DIAGNOSIS — F41.1 GAD (GENERALIZED ANXIETY DISORDER): ICD-10-CM

## 2021-06-30 DIAGNOSIS — F43.10 PTSD (POST-TRAUMATIC STRESS DISORDER): ICD-10-CM

## 2021-06-30 PROCEDURE — 99213 OFFICE O/P EST LOW 20 MIN: CPT | Mod: 95 | Performed by: INTERNAL MEDICINE

## 2021-06-30 RX ORDER — SERTRALINE HYDROCHLORIDE 100 MG/1
100 TABLET, FILM COATED ORAL DAILY
Qty: 90 TABLET | Refills: 3 | Status: SHIPPED | OUTPATIENT
Start: 2021-06-30 | End: 2023-02-06

## 2021-06-30 NOTE — PROGRESS NOTES
VIDEO VISIT                                                       ASSESSMENT/PLAN                                                      (F42.9) Obsessive-compulsive disorder, unspecified type  (primary encounter diagnosis)  (F41.1) ADDY (generalized anxiety disorder)  (F33.42) Recurrent major depressive disorder, in full remission (H)  (F43.10) PTSD (post-traumatic stress disorder)  Comment: significant side effects, but mood is well controlled on current dose of Zoloft.  Plan: continue present management; refills provided; can consider trial of oral iron supplement for restless legs in case of iron deficiency anemia (patient menstruates monthly).    Total time of video call between patient and provider was 5 minutes (11:40-11:45am). Provider location: office. Patient location: home. Platform: MyW[employer].     Josselin Diamond MD   [facility]  [address] [address]  T: [phone], F: [phone]    SUBJECTIVE                                                      Diana Altamirano is a very pleasant 31 year old female who requested a video visit to discuss mood:    Patient was made aware that this visit will be billed the same as an in-person visit and has given verbal consent to proceed with this video visit.     Patient is on Zoloft 150 mg daily for OCD, ADDY, MDD, and PTSD.  Reports that mood is well controlled at this dose. She does report significant side effects including restless legs, night sweats, and decreased libido with higher doses of Zoloft, but she is willing to accept the side effects for the positive benefit Zoloft has on her mood. She would like to continue on her current dose without change.    OBJECTIVE                                                       Vitals: No vitals were obtained today due to virtual visit.    General: appears healthy, alert and no distress  Psychiatric: mentation normal, affect normal/bright, judgement and insight intact, normal speech and  appearance well-groomed    ---    (Note was completed, in part, with [employer] voice-recognition software. Documentation reviewed, but some grammatical, spelling, and word errors may remain.)

## 2021-08-02 NOTE — PROGRESS NOTES
06/16/20 0025   Patient Belongings   Did you bring any home meds/supplements to the hospital?  No   Patient Belongings remains with patient;locker   Patient Belongings Remaining with Patient clothing   Patient Belongings Put in Hospital Secure Location (Security or Locker, etc.) cell phone/electronics;clothing;watch;shoes   Belongings Search Yes   Clothing Search Yes   Second Staff David HUNTER       Cell phone-2  Cell phone -1  Leggings-1 pair  Shirt- 1  Sandals-1 pair   Underwear-1 pair remains with patient  Sports bra-remains with patient   Menstrual cup-remains with patient   Watch-1     A               Admission:  I am responsible for any personal items that are not sent to the safe or pharmacy.  [facility] is not responsible for loss, theft or damage of any property in my possession.    Signature:  _________________________________ Date: _______  Time: _____                                              Staff Signature:  ____________________________ Date: ________  Time: _____      2nd Staff person, if patient is unable/unwilling to sign:    Signature: ________________________________ Date: ________  Time: _____     Discharge:  [facility] has returned all of my personal belongings:    Signature: _________________________________ Date: ________  Time: _____                                          Staff Signature:  ____________________________ Date: ________  Time: _____           Tranexamic Acid Pregnancy And Lactation Text: It is unknown if this medication is safe during pregnancy or breast feeding.

## 2021-10-09 ENCOUNTER — HEALTH MAINTENANCE LETTER (OUTPATIENT)
Age: 31
End: 2021-10-09

## 2022-01-24 ENCOUNTER — MYC MEDICAL ADVICE (OUTPATIENT)
Dept: INTERNAL MEDICINE | Facility: CLINIC | Age: 32
End: 2022-01-24
Payer: COMMERCIAL

## 2022-01-26 ENCOUNTER — E-VISIT (OUTPATIENT)
Dept: INTERNAL MEDICINE | Facility: CLINIC | Age: 32
End: 2022-01-26
Payer: COMMERCIAL

## 2022-01-26 DIAGNOSIS — R05.9 COUGH: Primary | ICD-10-CM

## 2022-01-26 PROCEDURE — 99421 OL DIG E/M SVC 5-10 MIN: CPT | Performed by: INTERNAL MEDICINE

## 2022-01-29 ENCOUNTER — HEALTH MAINTENANCE LETTER (OUTPATIENT)
Age: 32
End: 2022-01-29

## 2022-04-25 LAB
BASOPHILS # BLD AUTO: 0 10E3/UL (ref 0–0.2)
BASOPHILS NFR BLD AUTO: 0 %
EOSINOPHIL # BLD AUTO: 0 10E3/UL (ref 0–0.7)
EOSINOPHIL NFR BLD AUTO: 1 %
ERYTHROCYTE [DISTWIDTH] IN BLOOD BY AUTOMATED COUNT: 12.5 % (ref 10–15)
HCG UR QL: NEGATIVE
HCT VFR BLD AUTO: 42.4 % (ref 35–47)
HGB BLD-MCNC: 14.2 G/DL (ref 11.7–15.7)
IMM GRANULOCYTES # BLD: 0 10E3/UL
IMM GRANULOCYTES NFR BLD: 0 %
LYMPHOCYTES # BLD AUTO: 0.6 10E3/UL (ref 0.8–5.3)
LYMPHOCYTES NFR BLD AUTO: 6 %
MCH RBC QN AUTO: 29.1 PG (ref 26.5–33)
MCHC RBC AUTO-ENTMCNC: 33.5 G/DL (ref 31.5–36.5)
MCV RBC AUTO: 87 FL (ref 78–100)
MONOCYTES # BLD AUTO: 0.4 10E3/UL (ref 0–1.3)
MONOCYTES NFR BLD AUTO: 4 %
NEUTROPHILS # BLD AUTO: 7.9 10E3/UL (ref 1.6–8.3)
NEUTROPHILS NFR BLD AUTO: 89 %
NRBC # BLD AUTO: 0 10E3/UL
NRBC BLD AUTO-RTO: 0 /100
PLATELET # BLD AUTO: 252 10E3/UL (ref 150–450)
RBC # BLD AUTO: 4.88 10E6/UL (ref 3.8–5.2)
WBC # BLD AUTO: 8.9 10E3/UL (ref 4–11)

## 2022-04-25 PROCEDURE — 96375 TX/PRO/DX INJ NEW DRUG ADDON: CPT

## 2022-04-25 PROCEDURE — 81025 URINE PREGNANCY TEST: CPT | Performed by: EMERGENCY MEDICINE

## 2022-04-25 PROCEDURE — 99285 EMERGENCY DEPT VISIT HI MDM: CPT | Mod: 25

## 2022-04-25 PROCEDURE — 250N000011 HC RX IP 250 OP 636: Performed by: EMERGENCY MEDICINE

## 2022-04-25 PROCEDURE — 96374 THER/PROPH/DIAG INJ IV PUSH: CPT | Mod: 59

## 2022-04-25 PROCEDURE — 85025 COMPLETE CBC W/AUTO DIFF WBC: CPT | Performed by: EMERGENCY MEDICINE

## 2022-04-25 PROCEDURE — 80053 COMPREHEN METABOLIC PANEL: CPT | Performed by: EMERGENCY MEDICINE

## 2022-04-25 PROCEDURE — 36415 COLL VENOUS BLD VENIPUNCTURE: CPT | Performed by: EMERGENCY MEDICINE

## 2022-04-25 PROCEDURE — 96376 TX/PRO/DX INJ SAME DRUG ADON: CPT

## 2022-04-25 PROCEDURE — 81001 URINALYSIS AUTO W/SCOPE: CPT | Performed by: EMERGENCY MEDICINE

## 2022-04-25 RX ORDER — HYDROMORPHONE HYDROCHLORIDE 1 MG/ML
0.5 INJECTION, SOLUTION INTRAMUSCULAR; INTRAVENOUS; SUBCUTANEOUS
Status: DISCONTINUED | OUTPATIENT
Start: 2022-04-25 | End: 2022-04-26 | Stop reason: HOSPADM

## 2022-04-25 RX ORDER — ONDANSETRON 2 MG/ML
4 INJECTION INTRAMUSCULAR; INTRAVENOUS EVERY 30 MIN PRN
Status: DISCONTINUED | OUTPATIENT
Start: 2022-04-25 | End: 2022-04-26 | Stop reason: HOSPADM

## 2022-04-25 RX ORDER — ONDANSETRON 4 MG/1
4 TABLET, ORALLY DISINTEGRATING ORAL ONCE
Status: COMPLETED | OUTPATIENT
Start: 2022-04-25 | End: 2022-04-25

## 2022-04-25 RX ADMIN — ONDANSETRON 4 MG: 4 TABLET, ORALLY DISINTEGRATING ORAL at 22:45

## 2022-04-25 RX ADMIN — ONDANSETRON 4 MG: 2 INJECTION INTRAMUSCULAR; INTRAVENOUS at 23:05

## 2022-04-25 RX ADMIN — HYDROMORPHONE HYDROCHLORIDE 0.5 MG: 1 INJECTION, SOLUTION INTRAMUSCULAR; INTRAVENOUS; SUBCUTANEOUS at 23:06

## 2022-04-26 ENCOUNTER — HOSPITAL ENCOUNTER (EMERGENCY)
Facility: CLINIC | Age: 32
Discharge: HOME | End: 2022-04-26
Attending: EMERGENCY MEDICINE | Admitting: EMERGENCY MEDICINE
Payer: COMMERCIAL

## 2022-04-26 ENCOUNTER — APPOINTMENT (OUTPATIENT)
Dept: ULTRASOUND IMAGING | Facility: CLINIC | Age: 32
End: 2022-04-26
Attending: EMERGENCY MEDICINE
Payer: COMMERCIAL

## 2022-04-26 ENCOUNTER — APPOINTMENT (OUTPATIENT)
Dept: CT IMAGING | Facility: CLINIC | Age: 32
End: 2022-04-26
Attending: EMERGENCY MEDICINE
Payer: COMMERCIAL

## 2022-04-26 ENCOUNTER — PATIENT OUTREACH (OUTPATIENT)
Dept: INTERNAL MEDICINE | Facility: CLINIC | Age: 32
End: 2022-04-26
Payer: COMMERCIAL

## 2022-04-26 VITALS
TEMPERATURE: 97.4 F | SYSTOLIC BLOOD PRESSURE: 101 MMHG | HEIGHT: 65 IN | OXYGEN SATURATION: 94 % | HEART RATE: 77 BPM | DIASTOLIC BLOOD PRESSURE: 59 MMHG | WEIGHT: 130 LBS | RESPIRATION RATE: 22 BRPM | BODY MASS INDEX: 21.66 KG/M2

## 2022-04-26 DIAGNOSIS — R10.31 RLQ ABDOMINAL PAIN: ICD-10-CM

## 2022-04-26 LAB
ALBUMIN SERPL-MCNC: 4.1 G/DL (ref 3.4–5)
ALBUMIN UR-MCNC: NEGATIVE MG/DL
ALP SERPL-CCNC: 43 U/L (ref 40–150)
ALT SERPL W P-5'-P-CCNC: 22 U/L (ref 0–50)
ANION GAP SERPL CALCULATED.3IONS-SCNC: 10 MMOL/L (ref 3–14)
APPEARANCE UR: ABNORMAL
AST SERPL W P-5'-P-CCNC: 30 U/L (ref 0–45)
BILIRUB SERPL-MCNC: 0.8 MG/DL (ref 0.2–1.3)
BILIRUB UR QL STRIP: NEGATIVE
BUN SERPL-MCNC: 14 MG/DL (ref 7–30)
CALCIUM SERPL-MCNC: 9.3 MG/DL (ref 8.5–10.1)
CHLORIDE BLD-SCNC: 104 MMOL/L (ref 94–109)
CO2 SERPL-SCNC: 23 MMOL/L (ref 20–32)
COLOR UR AUTO: ABNORMAL
CREAT SERPL-MCNC: 0.69 MG/DL (ref 0.52–1.04)
GFR SERPL CREATININE-BSD FRML MDRD: >90 ML/MIN/1.73M2
GLUCOSE BLD-MCNC: 134 MG/DL (ref 70–99)
GLUCOSE UR STRIP-MCNC: NEGATIVE MG/DL
HGB UR QL STRIP: ABNORMAL
KETONES UR STRIP-MCNC: NEGATIVE MG/DL
LEUKOCYTE ESTERASE UR QL STRIP: NEGATIVE
MUCOUS THREADS #/AREA URNS LPF: PRESENT /LPF
NITRATE UR QL: NEGATIVE
PH UR STRIP: 5.5 [PH] (ref 5–7)
POTASSIUM BLD-SCNC: 3.1 MMOL/L (ref 3.4–5.3)
PROT SERPL-MCNC: 8.1 G/DL (ref 6.8–8.8)
RBC URINE: 1 /HPF
SODIUM SERPL-SCNC: 137 MMOL/L (ref 133–144)
SP GR UR STRIP: 1.02 (ref 1–1.03)
SQUAMOUS EPITHELIAL: 2 /HPF
UROBILINOGEN UR STRIP-MCNC: NORMAL MG/DL
WBC URINE: 2 /HPF

## 2022-04-26 PROCEDURE — 250N000009 HC RX 250: Performed by: EMERGENCY MEDICINE

## 2022-04-26 PROCEDURE — 250N000011 HC RX IP 250 OP 636: Performed by: EMERGENCY MEDICINE

## 2022-04-26 PROCEDURE — 93976 VASCULAR STUDY: CPT

## 2022-04-26 PROCEDURE — 74177 CT ABD & PELVIS W/CONTRAST: CPT

## 2022-04-26 PROCEDURE — 258N000003 HC RX IP 258 OP 636: Performed by: EMERGENCY MEDICINE

## 2022-04-26 PROCEDURE — 96375 TX/PRO/DX INJ NEW DRUG ADDON: CPT

## 2022-04-26 PROCEDURE — 96361 HYDRATE IV INFUSION ADD-ON: CPT

## 2022-04-26 RX ORDER — KETOROLAC TROMETHAMINE 15 MG/ML
15 INJECTION, SOLUTION INTRAMUSCULAR; INTRAVENOUS ONCE
Status: COMPLETED | OUTPATIENT
Start: 2022-04-26 | End: 2022-04-26

## 2022-04-26 RX ORDER — IOPAMIDOL 755 MG/ML
65 INJECTION, SOLUTION INTRAVASCULAR ONCE
Status: COMPLETED | OUTPATIENT
Start: 2022-04-26 | End: 2022-04-26

## 2022-04-26 RX ORDER — SODIUM CHLORIDE 9 MG/ML
INJECTION, SOLUTION INTRAVENOUS CONTINUOUS
Status: DISCONTINUED | OUTPATIENT
Start: 2022-04-26 | End: 2022-04-26 | Stop reason: HOSPADM

## 2022-04-26 RX ADMIN — KETOROLAC TROMETHAMINE 15 MG: 15 INJECTION, SOLUTION INTRAMUSCULAR; INTRAVENOUS at 02:18

## 2022-04-26 RX ADMIN — SODIUM CHLORIDE 1000 ML: 9 INJECTION, SOLUTION INTRAVENOUS at 02:17

## 2022-04-26 RX ADMIN — IOPAMIDOL 65 ML: 755 INJECTION, SOLUTION INTRAVENOUS at 03:42

## 2022-04-26 RX ADMIN — SODIUM CHLORIDE 60 ML: 900 INJECTION INTRAVENOUS at 03:43

## 2022-04-26 ASSESSMENT — ENCOUNTER SYMPTOMS: ABDOMINAL PAIN: 1

## 2022-04-26 NOTE — ED TRIAGE NOTES
Pt had some abdominal pain that started a couple hours ago; RLQ, states that it is normally where she gets cyst pain.  Pt also notes nausea.

## 2022-04-26 NOTE — ED PROVIDER NOTES
"  History   Chief Complaint:  Abdominal Pain       HPI   Diana Altamirano is a 32 year old female who presents with abdominal pain which developed 2000. She reports that her abdominal pain feels similar to a past cyst but that her pain was not as severe. She has taken Zofran and dilaudid. Her abdominal pain is worse when she leans over.    Review of Systems   Gastrointestinal: Positive for abdominal pain.   10 point review of systems performed and is negative except as above and in HPI.     Allergies:  No known drug allergies     Medications:  Ativan  Zoloft     Past Medical History:     Anorexia  Depressive disorder  ADDY  OCD  PTSD   MDD      Family History:    Prostate cancer  Neurologic disorder     Social History:  Marital status:    Patient is accompanied     Physical Exam     Patient Vitals for the past 24 hrs:   BP Temp Temp src Pulse Resp SpO2 Height Weight   04/26/22 0200 -- -- -- -- -- 94 % -- --   04/26/22 0140 109/63 -- -- 92 -- 92 % -- --   04/25/22 2241 130/68 97.4  F (36.3  C) Temporal 106 22 99 % 1.651 m (5' 5\") 59 kg (130 lb)       Physical Exam    General: Resting on the gurney, appears uncomfortable  Head:  The scalp, face, and head appear normal  Mouth/Throat: Mucus membranes are moist  CV:  Regular rate    Normal S1 and S2  No pathological murmur   Resp:  Breath sounds clear and equal bilaterally    Non-labored, no retractions or accessory muscle use    No coarseness    No wheezing   GI:  Abdomen is soft, no rigidity    Right lower quadrant tenderness to palpation. No guarding or rebound.  MS:  Normal motor assessment of all extremities.    Good capillary refill noted.  Skin:   No rash or lesions noted.  Neuro:   Speech is normal and fluent. No apparent deficit.  Psych: Awake. Alert.  Normal affect.      Appropriate interactions.     Emergency Department Course     Imaging:  CT Abdomen Pelvis w Contrast   Final Result   IMPRESSION:    1.  No obvious acute abnormalities seen. No evidence " for acute appendicitis.      US Pelvis Cmplt w Transvag & Doppler LmtPel Duplex Limited   Final Result   IMPRESSION:     1.  Normal pelvic ultrasound.                 Report per radiology    Laboratory:  Labs Ordered and Resulted from Time of ED Arrival to Time of ED Departure   COMPREHENSIVE METABOLIC PANEL - Abnormal       Result Value    Sodium 137      Potassium 3.1 (*)     Chloride 104      Carbon Dioxide (CO2) 23      Anion Gap 10      Urea Nitrogen 14      Creatinine 0.69      Calcium 9.3      Glucose 134 (*)     Alkaline Phosphatase 43      AST 30      ALT 22      Protein Total 8.1      Albumin 4.1      Bilirubin Total 0.8      GFR Estimate >90     CBC WITH PLATELETS AND DIFFERENTIAL - Abnormal    WBC Count 8.9      RBC Count 4.88      Hemoglobin 14.2      Hematocrit 42.4      MCV 87      MCH 29.1      MCHC 33.5      RDW 12.5      Platelet Count 252      % Neutrophils 89      % Lymphocytes 6      % Monocytes 4      % Eosinophils 1      % Basophils 0      % Immature Granulocytes 0      NRBCs per 100 WBC 0      Absolute Neutrophils 7.9      Absolute Lymphocytes 0.6 (*)     Absolute Monocytes 0.4      Absolute Eosinophils 0.0      Absolute Basophils 0.0      Absolute Immature Granulocytes 0.0      Absolute NRBCs 0.0     UA MACROSCOPIC WITH REFLEX TO MICRO AND CULTURE - Abnormal    Color Urine Orange (*)     Appearance Urine Cloudy (*)     Glucose Urine Negative      Bilirubin Urine Negative      Ketones Urine Negative      Specific Gravity Urine 1.020      Blood Urine Trace (*)     pH Urine 5.5      Protein Albumin Urine Negative      Urobilinogen Urine Normal      Nitrite Urine Negative      Leukocyte Esterase Urine Negative      Mucus Urine Present (*)     RBC Urine 1      WBC Urine 2      Squamous Epithelials Urine 2 (*)    HCG QUALITATIVE URINE - Normal    hCG Urine Qualitative Negative        Emergency Department Course:  Reviewed:  I reviewed nursing notes, vitals, past medical history and Care  Everywhere    Assessments:  0148 I obtained history and examined the patient as noted above.   0320 I rechecked the patient and explained findings.     Interventions:  2305 Zofran, 4 mg, IV  2306 Dilaudid, 0.5 mg, IV   2245 Zofran, 4 mg, IV  0217 Sodium chloride bolus, 1000 ml, IV   0218 Toradol, 15 mg, IV     Disposition:  The patient was discharged to home.     Impression & Plan   Medical Decision Making:    Diana Altamirano is a 32 year old female presented to the Emergency Department with right lower quadrant abdominal pain.  The clinical exam today is non-specific. The laboratory testing does not reveal a cause for the patient's pain.  Imaging is noted to be normal.  The exact etiology of the abdominal pain is not clear at this time.  The differential diagnosis of abdominal pain includes: Appendicitis, Bowel Obstruction, Ulcer, Ischemia, Cholecystitis, Diverticulitis, Pancreatitis, UTI, kidney stone, Enteritis/Colitis, amongst many other etiologies.  No life threatening cause or need for emergent surgery or hospital admission is detected today.  The patient was advised that if symptoms do not completely resolve within another 12-24 hours re-evaluation with primary care or return to the ED is indicated. The pateient also understands that if they worsen, they should return to the ER right away. I discussed the uncertainty about the diagnosis and answered the patient's questions.     Diagnosis:    ICD-10-CM    1. RLQ abdominal pain  R10.31        Scribe Disclosure:  [employer], am serving as a scribe at 1:45 AM on 4/26/2022 to document services personally performed by Jess Matta MD based on my observations and the provider's statements to me.          Jess Matta MD  04/26/22 0612

## 2022-04-26 NOTE — TELEPHONE ENCOUNTER
What type of discharge? Emergency Department  Risk of Hospital admission or ED visit: 45%  Is a TCM episode required? No  When should the patient follow up with PCP? within 30 days of discharge.    Chalino Maher RN  [facility]

## 2022-09-17 ENCOUNTER — HEALTH MAINTENANCE LETTER (OUTPATIENT)
Age: 32
End: 2022-09-17

## 2023-01-31 ASSESSMENT — ENCOUNTER SYMPTOMS
NAUSEA: 0
COUGH: 0
DIZZINESS: 0
SORE THROAT: 0
DIARRHEA: 0
HEMATURIA: 0
WEAKNESS: 0
CHILLS: 0
CONSTIPATION: 0
PARESTHESIAS: 0
ABDOMINAL PAIN: 0
HEADACHES: 1
HEARTBURN: 0
BREAST MASS: 0
SHORTNESS OF BREATH: 0
FEVER: 0
PALPITATIONS: 0
HEMATOCHEZIA: 0
MYALGIAS: 0
ARTHRALGIAS: 0
NERVOUS/ANXIOUS: 1
DYSURIA: 0
EYE PAIN: 0
FREQUENCY: 0
JOINT SWELLING: 0

## 2023-02-07 ENCOUNTER — OFFICE VISIT (OUTPATIENT)
Dept: INTERNAL MEDICINE | Facility: CLINIC | Age: 33
End: 2023-02-07
Payer: COMMERCIAL

## 2023-02-07 VITALS
BODY MASS INDEX: 21.34 KG/M2 | OXYGEN SATURATION: 99 % | WEIGHT: 128.1 LBS | HEIGHT: 65 IN | HEART RATE: 98 BPM | SYSTOLIC BLOOD PRESSURE: 118 MMHG | DIASTOLIC BLOOD PRESSURE: 78 MMHG

## 2023-02-07 DIAGNOSIS — L29.0 PERIANAL ITCH: ICD-10-CM

## 2023-02-07 DIAGNOSIS — Z23 NEED FOR VACCINATION: ICD-10-CM

## 2023-02-07 DIAGNOSIS — Z12.4 SCREENING FOR CERVICAL CANCER: ICD-10-CM

## 2023-02-07 DIAGNOSIS — Z00.00 ROUTINE HISTORY AND PHYSICAL EXAMINATION OF ADULT: Primary | ICD-10-CM

## 2023-02-07 PROCEDURE — 87624 HPV HI-RISK TYP POOLED RSLT: CPT | Performed by: INTERNAL MEDICINE

## 2023-02-07 PROCEDURE — 90714 TD VACC NO PRESV 7 YRS+ IM: CPT | Performed by: INTERNAL MEDICINE

## 2023-02-07 PROCEDURE — 99395 PREV VISIT EST AGE 18-39: CPT | Mod: 25 | Performed by: INTERNAL MEDICINE

## 2023-02-07 PROCEDURE — 90471 IMMUNIZATION ADMIN: CPT | Performed by: INTERNAL MEDICINE

## 2023-02-07 PROCEDURE — G0124 SCREEN C/V THIN LAYER BY MD: HCPCS | Performed by: PATHOLOGY

## 2023-02-07 PROCEDURE — G0145 SCR C/V CYTO,THINLAYER,RESCR: HCPCS | Performed by: INTERNAL MEDICINE

## 2023-02-07 RX ORDER — HYDROCORTISONE 25 MG/G
CREAM TOPICAL 2 TIMES DAILY PRN
Qty: 30 G | Refills: 1 | Status: SHIPPED | OUTPATIENT
Start: 2023-02-07

## 2023-02-07 ASSESSMENT — PATIENT HEALTH QUESTIONNAIRE - PHQ9
SUM OF ALL RESPONSES TO PHQ QUESTIONS 1-9: 8
SUM OF ALL RESPONSES TO PHQ QUESTIONS 1-9: 8
10. IF YOU CHECKED OFF ANY PROBLEMS, HOW DIFFICULT HAVE THESE PROBLEMS MADE IT FOR YOU TO DO YOUR WORK, TAKE CARE OF THINGS AT HOME, OR GET ALONG WITH OTHER PEOPLE: SOMEWHAT DIFFICULT

## 2023-02-07 NOTE — PROGRESS NOTES
ASSESSMENT/PLAN                                                       (Z00.00) Routine history and physical examination of adult  (primary encounter diagnosis)  Comment: PMH, PSH, FH, SH, medications, allergies, immunizations, and preventative health measures reviewed and updated as appropriate.  Plan: see below for plans.      (Z23) Need for vaccination  Plan: TD TD given today.    (Z12.4) Screening for cervical cancer  Plan: Pap obtained today.    (L29.0) Perianal itch  Comment: normal exam.  Plan: TRIAL of Anusol cream as needed; if symptoms worsen, change, or do not improve, patient to contact MD.      Josselin Diamond MD   [address] [address]  T: [phone], F: [phone]    SUBJECTIVE                                                      Diana Altamirano is a very pleasant 32 year old female who presents for a physical.    ROS:  Constitutional: no unintentional weight loss or gain reported; no fevers, chills, or sweats reported  Cardiovascular: no chest pain, palpitations, or edema reported  Respiratory: no cough, wheezing, shortness of breath, or dyspnea on exertion reported  Gastrointestinal: no nausea, vomiting, constipation, diarrhea, or abdominal pain reported  Genitourinary: no urinary frequency, urgency, dysuria, or hematuria reported  Integumentary: perianal itching  Musculoskeletal: no back pain, muscle pain, joint pain, or joint swelling reported  Neurologic: no focal weakness, numbness, or tingling reported  Hematologic: no easy bruising or bleeding reported  Endocrine: no heat or cold intolerance reported; no polyuria or polydipsia reported  Psychiatric: see PMH below    Past Medical History:   Diagnosis Date     Anorexia      ADDY (generalized anxiety disorder)      MDD (major depressive disorder)      OCD (obsessive compulsive disorder)      PTSD (post-traumatic stress disorder)      Past Surgical History:   Procedure Laterality Date     NO  HISTORY OF SURGERY       Family History   Problem Relation Age of Onset     Neurologic Disorder Mother         PD     Other - See Comments Sister         mental health     Prostate Cancer Paternal Grandfather      Neurologic Disorder Paternal Uncle         PD     Diabetes No family hx of      Myocardial Infarction No family hx of      Cerebrovascular Disease No family hx of      Coronary Artery Disease Early Onset No family hx of      Breast Cancer No family hx of      Ovarian Cancer No family hx of      Colon Cancer No family hx of      Social History     Occupational History     Occupation: [employer] - [employer]   Tobacco Use     Smoking status: Never     Smokeless tobacco: Never   Vaping Use     Vaping Use: Never used   Substance and Sexual Activity     Alcohol use: Yes     Comment: occ     Drug use: No     Sexual activity: Yes     Partners: Male     Birth control/protection: Condom   Social History Narrative    .    No kids.    Running regularly.      No Known Allergies     Immunization History   Administered Date(s) Administered     COVID-19 Vaccine 12+ (Pfizer) 08/02/2021, 08/30/2021     TDAP Vaccine (Adacel) 08/14/2012     PREVENTATIVE HEALTH                                                      BMI: within normal limits   Blood pressure: within normal limits   Breast CA screening: not medically indicated at this time   Cervical CA screening: DUE  Colon CA screening: not medically indicated at this time   Lung CA screening: n/a   Dexa: not medically indicated at this time   Screening cholesterol: not medically indicated at this time   Screening diabetes: not medically indicated at this time   STD testing: no risk factors present  Alcohol misuse screening: alcohol use reviewed - no intervention indicated at this time  Immunizations: reviewed; Tetanus booster DUE and Flu shot and COVID-19 booster DUE - patient declines    OBJECTIVE                                                      /78   Pulse 98   Ht  "1.651 m (5' 5\")   Wt 58.1 kg (128 lb 1.6 oz)   LMP 01/24/2023 (Exact Date)   SpO2 99%   BMI 21.32 kg/m    Constitutional: well-appearing  Head, Ears, and Eyes: normocephalic; normal external auditory canal and pinna; tympanic membranes visualized and normal; normal lids and conjunctivae  Neck: supple, symmetric, no thyromegaly or lymphadenopathy  Respiratory: normal respiratory effort; clear to auscultation bilaterally  Cardiovascular: regular rate and rhythm; no edema  Gastrointestinal: soft, non-tender, and non-distended; no organomegaly or masses  Genitourinary: external genitalia, urethral meatus, and vagina normal; cervix visualized and normal in appearance  Perianal exam: no external hemorrhoids, fissures, or skin changes  Musculoskeletal: normal gait and station  Psych: normal judgment and insight; normal mood and affect; recent and remote memory intact    ---  (Note was completed, in part, with [employer] voice-recognition software. Documentation was reviewed, but some grammatical, spelling, and word errors may remain.)    "

## 2023-02-13 LAB
BKR LAB AP GYN ADEQUACY: ABNORMAL
BKR LAB AP GYN INTERPRETATION: ABNORMAL
BKR LAB AP HPV REFLEX: ABNORMAL
BKR LAB AP PREVIOUS ABNORMAL: ABNORMAL
PATH REPORT.COMMENTS IMP SPEC: ABNORMAL
PATH REPORT.COMMENTS IMP SPEC: ABNORMAL
PATH REPORT.RELEVANT HX SPEC: ABNORMAL

## 2023-02-16 PROBLEM — R87.610 ASCUS OF CERVIX WITH NEGATIVE HIGH RISK HPV: Status: ACTIVE | Noted: 2023-02-07

## 2023-02-16 LAB
HUMAN PAPILLOMA VIRUS 16 DNA: NEGATIVE
HUMAN PAPILLOMA VIRUS 18 DNA: NEGATIVE
HUMAN PAPILLOMA VIRUS FINAL DIAGNOSIS: NORMAL
HUMAN PAPILLOMA VIRUS OTHER HR: NEGATIVE

## 2023-09-08 NOTE — PROVIDER NOTIFICATION
06/18/20 0831   Vital Signs   Temp 98.3  F (36.8  C)   Temp src Temporal   Pulse 100   Pulse/Heart Rate Source Monitor   /85   BP Location Left arm   Standing Orthostatic BP   Standing Orthostatic /76   Standing Orthostatic Pulse 112 bpm   Oxygen Therapy   SpO2 100 %   O2 Device None (Room air)   Pain/Comfort   Patient Currently in Pain yes  (dizzy, nauseous- pt thinks it is new medication)   Preferred Pain Scale CAPYOVANI (Group)   Height and Weight   Weight 51.8 kg (114 lb 4.8 oz)      Patient discharged home with . No medications to . Medication changes, follow up appointments & discharge instructions reviewed with patient. Wheelchair ride provided to exit. Patient able to ambulate safely with assistance from .

## 2023-10-27 NOTE — PLAN OF CARE
ADMISSION NOTE.    Admitted a 30/F with Hx of OCD, Anorexia, Depression, ADDY and PTSD from [facility] ED at 0000. Pt was transported here by mom and brought up to the station by staff. Per ED notes, Pt was seen a week ago, declined any support but then came back today tearful. Pt was washing everything in her house, family would touch something and Pt would yell at them.  Denies SI, HI. Pt also reports poor appetite, having racing thoughts and compulsions to wash everything. Pt was brought in by her mom due to increased symptoms of anxiety related to OCD. Pt indicated she was unable to function in her home or at work and she noticed an increase in anxiety and OCD behaviors as a result of COVID19 and the civil and social events in [place] recently. Pt also has experienced grief over her  who has recently left her. She experiences obsessions, compulsions, excessive worry, tension, racing thoughts, irritability and tearfulness.    Upon arrival on the unit, Pt was cooperative with search although did not want to be touched. Pt was teary stating that she was overwhelmed and sad and that her spouse left her recently as he could not cope with her issues. Reassured that her needs would be met. Cooperative with assessment. Pt endorses drinking a couple of drinks at a time when she drinks but did drink more than six drinks last Saturday, her last drink was this afternoon. Denies any other chemical use/abuse. Pt endorsed anxiety and the need to sleep. Denied suicidal thoughts and contracts for safety. Prn Trazodone and Hydroxyzine administered. Also requested and given sound machine to help with sleep.     Admitted on  SIB, Suicide, withdrawal, single room precautions and is voluntary. COVID test in process, Pt is asymptomatic. MSSA of 4. Nursing to update the provider if MSSA is 8 or above for additional orders.   Physical signs of severe muscle wasting and fat depletion as noted

## 2024-01-08 ENCOUNTER — PATIENT OUTREACH (OUTPATIENT)
Dept: CARE COORDINATION | Facility: CLINIC | Age: 34
End: 2024-01-08
Payer: COMMERCIAL

## 2024-01-22 ENCOUNTER — PATIENT OUTREACH (OUTPATIENT)
Dept: CARE COORDINATION | Facility: CLINIC | Age: 34
End: 2024-01-22
Payer: COMMERCIAL

## 2024-05-04 ENCOUNTER — HEALTH MAINTENANCE LETTER (OUTPATIENT)
Age: 34
End: 2024-05-04

## 2024-12-12 PROBLEM — R87.610 ASCUS OF CERVIX WITH NEGATIVE HIGH RISK HPV: Status: RESOLVED | Noted: 2023-02-07 | Resolved: 2024-12-12

## 2025-05-14 ENCOUNTER — THERAPY VISIT (OUTPATIENT)
Dept: PHYSICAL THERAPY | Facility: CLINIC | Age: 35
End: 2025-05-14
Attending: PHYSICIAN ASSISTANT
Payer: COMMERCIAL

## 2025-05-14 DIAGNOSIS — G89.29 CHRONIC BILATERAL LOW BACK PAIN WITH BILATERAL SCIATICA: Primary | ICD-10-CM

## 2025-05-14 DIAGNOSIS — M54.31 BILATERAL SCIATICA: ICD-10-CM

## 2025-05-14 DIAGNOSIS — M54.41 CHRONIC BILATERAL LOW BACK PAIN WITH BILATERAL SCIATICA: Primary | ICD-10-CM

## 2025-05-14 DIAGNOSIS — M54.32 BILATERAL SCIATICA: ICD-10-CM

## 2025-05-14 DIAGNOSIS — M54.42 CHRONIC BILATERAL LOW BACK PAIN WITH BILATERAL SCIATICA: Primary | ICD-10-CM

## 2025-05-14 PROCEDURE — 97161 PT EVAL LOW COMPLEX 20 MIN: CPT | Mod: GP | Performed by: PHYSICAL THERAPIST

## 2025-05-14 PROCEDURE — 97112 NEUROMUSCULAR REEDUCATION: CPT | Mod: GP | Performed by: PHYSICAL THERAPIST

## 2025-05-14 NOTE — PROGRESS NOTES
PHYSICAL THERAPY EVALUATION  Type of Visit: Evaluation       Fall Risk Screen:  Have you fallen 2 or more times in the past year?: No  Have you fallen and had an injury in the past year?: No    Subjective         Presenting condition or subjective complaint: Pain/numbness/tingling/muscle weakness in right leg/arm  Pt complaining of B LE tingling symptoms.  Started in L leg, but now feeling pain in R leg and calf after she saw chiro/PT session and was given some HEP to help.  Pain has been ongoing for about 2 months.  Pt reports insidious onset.  She did have some pain on/off with running.  She was training and doing a lot of miles running (60 miles per week) for a marathon in January.  Pt also did a half marathon race in February.  Took a month off and focused on strength training.  Currently pt running 3 times a week for about 4-6 miles each run (averaging 20 miles).  Denies bowel/bladder changes.   Has another marathon in January 2026 to train for.    Date of onset: 03/21/25 (MD order date)    Relevant medical history: Mental Illness   Past Medical History:   Diagnosis Date    Anorexia     ADDY (generalized anxiety disorder)     MDD (major depressive disorder)     OCD (obsessive compulsive disorder)     PTSD (post-traumatic stress disorder)      Dates & types of surgery:    Past Surgical History:   Procedure Laterality Date    NO HISTORY OF SURGERY       Prior diagnostic imaging/testing results:     none  Prior therapy history for the same diagnosis, illness or injury: Yes One pt session with prescribed exercises for sciatica    Prior Level of Function  Transfers:   Ambulation:   ADL:   IADL:     Living Environment  Social support: Alone   Type of home: House   Stairs to enter the home: Yes 3 Is there a railing: Yes     Ramp: No   Stairs inside the home: Yes 3 Is there a railing: Yes     Help at home: None  Equipment owned:       Employment: Yes [employer]   Hobbies/Interests: [place]  running,    Patient goals for therapy: Run long distances    Pain assessment:      Objective   LUMBAR SPINE EVALUATION  PAIN: Pain Level at Rest: 2/10  Pain Level with Use: 5/10  Pain Location: R low back and R calf throbbing  Pain Quality: throbbing, tightness in hamstring area, burning in low back  Pain Frequency: intermittent  Pain is Worst: feels it more at night as she is more aware and concentrating on it  Pain is Exacerbated By: sitting at work, can feel it walking, feels weak in R leg  Pain is Relieved By: not sure, she also has mental fixation and reports can be in her head.    Pain Progression: Improved  INTEGUMENTARY (edema, incisions): WNL  POSTURE: WNL  GAIT:   Weightbearing Status: WBAT  Assistive Device(s):   Gait Deviations: WNL  BALANCE/PROPRIOCEPTION: WNL  WEIGHTBEARING ALIGNMENT: WNL  NON-WEIGHTBEARING ALIGNMENT: WNL   ROM:  Repeated lumbar flexion in supine - no change but pt complains of R quad pain.  Repeated Extension in lying, pain in low back and pt reports increased leg symptoms.  Overall everything at the end aggravated her R leg symptoms.    (Degrees) Left AROM Left PROM  Right AROM Right PROM   Hip Flexion  WNL  WNL   Hip Extension  WNL  WNL   Hip Abduction  WNL  WNL   Hip Adduction       Hip Internal Rotation  WNL +  WNL+   Hip External Rotation  WNL+  WNL+   Knee Flexion       Knee Extension       Lumbar Side glide Min loss WNL   Lumbar Flexion WNL   Lumbar Extension hypermobile   Pain:   End feel:   PELVIC/SI SCREEN:   STRENGTH: WNL    MYOTOMES: WNL  All strong and painless.  Resisted prone hip ext does demonstrate poor lumbopelvic stability R > L.    DTR S: WNL  CORD SIGNS: WNL  DERMATOMES:   NEURAL TENSION: +Slump on R side  FLEXIBILITY: Decreased piriformis L, Decreased hamstrings L, Decreased hip flexors R, Decreased hamstrings R  LUMBAR/HIP Special Tests:    PELVIS/SI SPECIAL TESTS:   FUNCTIONAL TESTS: Double Leg Squat: Anterior knee translation, Knee valgus, Hip internal  rotation, and Improper use of glutes/hips  Single Leg Squat: Anterior knee translation, Knee valgus, Hip internal rotation, Improper use of glutes/hips, and L > R side  PALPATION: very tender to palpation B glut med and piriformis R > L side.  Pt did not tolerate min pressure palpation  SPINAL SEGMENTAL CONCLUSIONS:       Assessment & Plan   CLINICAL IMPRESSIONS  Medical Diagnosis: B sciatica    Treatment Diagnosis: B sciatica   Impression/Assessment: Patient is a 35 year old female with B sciatica and LBP complaints.  The following significant findings have been identified: Pain, Decreased ROM/flexibility, Decreased strength, Impaired muscle performance, Decreased activity tolerance, and Impaired posture. These impairments interfere with their ability to perform self care tasks, work tasks, recreational activities, and community mobility as compared to previous level of function.     Clinical Decision Making (Complexity):  Clinical Presentation: Stable/Uncomplicated  Clinical Presentation Rationale: based on medical and personal factors listed in PT evaluation  Clinical Decision Making (Complexity): Low complexity    PLAN OF CARE  Treatment Interventions:  Interventions: Manual Therapy, Neuromuscular Re-education, Therapeutic Activity, Therapeutic Exercise, Self-Care/Home Management    Long Term Goals     PT Goal 1  Goal Identifier: sitting  Goal Description: Pt will be able to sit x 1 hours 0/10 apin  Rationale: to maximize safety and independence with transportation  Goal Progress: unable to sit > 1 hour 4/10 pain  Target Date: 07/14/25      Frequency of Treatment: 1 x a week  Duration of Treatment: 3 weeks tapering to 2 times a month x 2 months    Recommended Referrals to Other Professionals: Physical Therapy  Education Assessment:        Risks and benefits of evaluation/treatment have been explained.   Patient/Family/caregiver agrees with Plan of Care.     Evaluation Time:     PT Melody, Low Complexity Minutes  (18331): 20       Signing Clinician: Jay Paez PT

## 2025-05-15 PROBLEM — M54.32 BILATERAL SCIATICA: Status: ACTIVE | Noted: 2025-05-15

## 2025-05-15 PROBLEM — M54.42 CHRONIC BILATERAL LOW BACK PAIN WITH BILATERAL SCIATICA: Status: ACTIVE | Noted: 2025-05-15

## 2025-05-15 PROBLEM — M54.31 BILATERAL SCIATICA: Status: ACTIVE | Noted: 2025-05-15

## 2025-05-15 PROBLEM — M54.41 CHRONIC BILATERAL LOW BACK PAIN WITH BILATERAL SCIATICA: Status: ACTIVE | Noted: 2025-05-15

## 2025-05-15 PROBLEM — G89.29 CHRONIC BILATERAL LOW BACK PAIN WITH BILATERAL SCIATICA: Status: ACTIVE | Noted: 2025-05-15

## 2025-08-21 ENCOUNTER — TRANSFERRED RECORDS (OUTPATIENT)
Dept: HEALTH INFORMATION MANAGEMENT | Facility: CLINIC | Age: 35
End: 2025-08-21
Payer: COMMERCIAL